# Patient Record
Sex: MALE | Race: WHITE | NOT HISPANIC OR LATINO | Employment: OTHER | URBAN - METROPOLITAN AREA
[De-identification: names, ages, dates, MRNs, and addresses within clinical notes are randomized per-mention and may not be internally consistent; named-entity substitution may affect disease eponyms.]

---

## 2017-12-07 ENCOUNTER — OFFICE VISIT (OUTPATIENT)
Dept: CARDIOLOGY | Facility: CLINIC | Age: 37
End: 2017-12-07
Payer: COMMERCIAL

## 2017-12-07 VITALS
WEIGHT: 234.56 LBS | DIASTOLIC BLOOD PRESSURE: 68 MMHG | SYSTOLIC BLOOD PRESSURE: 120 MMHG | BODY MASS INDEX: 29.16 KG/M2 | HEART RATE: 68 BPM | HEIGHT: 75 IN

## 2017-12-07 DIAGNOSIS — R42 VERTIGO: ICD-10-CM

## 2017-12-07 DIAGNOSIS — I34.0 NON-RHEUMATIC MITRAL REGURGITATION: Primary | ICD-10-CM

## 2017-12-07 DIAGNOSIS — Z86.79 H/O ANGINA PECTORIS: ICD-10-CM

## 2017-12-07 DIAGNOSIS — I49.3 PVC'S (PREMATURE VENTRICULAR CONTRACTIONS): ICD-10-CM

## 2017-12-07 DIAGNOSIS — Z13.220 LIPID SCREENING: ICD-10-CM

## 2017-12-07 PROCEDURE — 99999 PR PBB SHADOW E&M-NEW PATIENT-LVL III: CPT | Mod: PBBFAC,,, | Performed by: INTERNAL MEDICINE

## 2017-12-07 PROCEDURE — 99213 OFFICE O/P EST LOW 20 MIN: CPT | Mod: S$GLB,,, | Performed by: INTERNAL MEDICINE

## 2017-12-07 NOTE — PATIENT INSTRUCTIONS
About Arrhythmias    Electrical impulses cause the normal heart to beat 60 to 100 times a minute while at rest. These impulses come from a natural pacemaker deep inside the heart muscle. Each impulse causes the heart muscle to contract. This causes the blood to flow through the heart and out to the tissues and organs of your body.  An arrhythmia is a change from the normal speed or pattern of these electrical impulses. This can cause the heart to beat too fast (tachycardia); or too slow (bradycardia); or in an unsteady pattern (irregular rhythm).  Symptoms of arrhythmias  Different people experience arrhythmias differently. Sometimes they may not have symptoms, but just notice a change in their pulse. Symptoms can include:  · Fluttering feeling in the chest  · Shortness of breath  · Chest pain or pressure  · Neck fullness  · Lightheadedness or dizziness  · Fainting or almost fainting  · Palpitations (the sense that your heart is fluttering or beating fast or hard or irregularly)  · Tiredness, fatigue, or weakness  · Cardiac arrest  Causes of arrhythmias  Arrhythmias are most often due to heart disease such as:  · Coronary artery disease  · Heart valve disease  · Enlarged heart  · High blood pressure  · Heart failure  Other causes of  arrhythmia include:  · Certain medicines (such as asthma inhalers and decongestants)  · Some herbal supplements  · Cardiac stimulant drugs (such as cocaine, amphetamine, diet pills, certain decongestant cold medicines, caffeine, and nicotine)  · Excessive alcohol use  · Anxiety and panic disorder  · Thyroid disease  · Anemia  · Diabetes  · Sleep apnea  · Obesity  · Congenital heart disease  · Cardiac genetic diseases  Arrhythmias can often be prevented. The cause and type of arrhythmia determines the best treatment. Sometimes your doctor may want to monitor your heart rate over a 24-hour period or longer. This can help identify the cause of your arrhythmia and find the best treatment.  This can be done with a Holter monitor, a portable EKG recording device attached by wires to your chest. Or you may get an event monitor, which you can place over the skin in front of your heart to record heart rhythms. You can carry this with you as you go about your routine activities during the monitoring period. Implantable loop recorders may also be used to monitor the heart rhythm for up to 2 years. This miniature device is placed underneath the skin overlying the heart.  Home care  The following guidelines will help you care for yourself at home:  · Avoid cardiac stimulants (such as cocaine, amphetamine, diet pills, certain decongestant cold medicines, caffeine, and nicotine).  · If you smoke, stop smoking. Contact your doctor or a local stop-smoking program for help.  · Tell your doctor about any prescription, over-the-counter, or herbal medicines you take. These may be affecting your heart rhythm.  Follow-up care  Follow up with your healthcare provider, or as advised. If a Holter monitor has been recommended, contact the cardiologist you have been referred to as soon as you can  the device. Other outpatient tests may also be arranged for you at that time.  Call 911  This is the fastest and safest way to get to the emergency department. The paramedics can also start treatment on the way to the hospital, if needed.  Don't wait until your symptoms are severe to call 911. Other reasons to call 911 besides chest pain include:  · Chest, shoulder, arm, neck, or back pain  · Shortness of breath  · Feeling lightheaded, faint, or dizzy  · Unexplained fainting  · Rapid heart beat  · Slower than usual heart rate compared to your normal  · Very irregular heartbeat  · Chest pain (angina) with weakness, dizziness, heavy sweating, nausea, or vomiting  · Extreme drowsiness, or confusion  · Weakness of an arm or leg or one side of the face  · Difficulty with speech or vision  When to seek medical advice  Remember,  "things are not always like they are on TV. Sometimes it is not so obvious. You may only feel weak or just "not right." If it is not clear or if you have any doubt, call for advice.  · Seek help for chest pain, or it feels different from usual, even if your symptoms are mild.  · Don't drive yourself. Have someone else drive. If no one can drive you, call 911.  · If your doctor has given you medicines to take when you have symptoms, take them, but do not delay getting help while trying to find them.  Date Last Reviewed: 4/25/2016  © 8219-9312 [employer]. [address] [place] [address], [place] [address]. All rights reserved. This information is not intended as a substitute for professional medical care. Always follow your healthcare professional's instructions.        Dizziness (Uncertain Cause)  Dizziness is a common symptom. It may be described as lightheadedness, spinning, or feeling like you are going to faint. Dizziness can have many causes.  Be sure to tell the healthcare provider about:  · All medicines you take, including prescription, over-the-counter, herbs, and supplements  · Any other symptoms you have  · Any health problems you are being treated for  · Anything that causes the dizziness to get worse or better  Today's exam did not show an exact cause for your dizziness. Other tests may be needed. Follow up with your healthcare provider.  Home care  · Dizziness that occurs with sudden standing may be a sign of mild dehydration. Drink extra fluids for the next few days.  · If you recently started a new medicine, stopped a medicine, or had the dose of a current medicine changed, talk with the prescribing healthcare provider. Your medicine plan may need adjustment.  · If dizziness lasts more than a few seconds, sit or lie down until it passes. This may help prevent injury in case you pass out.  · Do not drive or use power tools or dangerous equipment until you have had no dizziness for at least 48 " hours.  Follow-up care  Follow up with your healthcare provider for further evaluation within the next 7 days or as advised.  When to seek medical advice  Call your healthcare provider for any of the following:  · Worsening of symptoms or new symptoms  · Passing out or seizure  · Repeated vomiting  · Headache  · Palpitations (the sense that your heart is fluttering or beating fast or hard)  · Shortness of breath  · Blood in vomit or stool (black or red color)  · Weakness of an arm or leg or one side of the face  · Vision or hearing changes  · Trouble walking or speaking  · Chest, arm, neck, back, or jaw pain  Date Last Reviewed: 8/23/2015 © 2000-2017 [employer]. [address] [place] [address], [place] [address]. All rights reserved. This information is not intended as a substitute for professional medical care. Always follow your healthcare professional's instructions.        Heart Valve Problems  Your hearts job is to pump blood through your body. That job starts with pumping blood through the heart itself. Inside your heart, blood passes through a series of one-way reyes called valves. If a valve works poorly, not enough blood moves forward. A problem heart valve may not open wide enough, not close tightly enough, or both. In any case, not enough blood is sent to the heart muscle or out to the body.  Symptoms of heart valve problems  You can have a problem valve for decades yet have no symptoms. If you do have symptoms, they may come on so slowly that you barely notice them. In other cases, though, symptoms appear suddenly. You might have one or more of these symptoms:  · Problems breathing when you lie down, exert yourself, or get stressed emotionally  · Pain, pressure, tightness, or numbness in your chest, neck, back, or arms (angina)  · Feeling dizzy, faint, or lightheaded  · Tiredness, especially with activity or as the day goes on  · Waking up at night coughing or short of breath  · A fast, pounding,  or irregular heartbeat  · A fluttering feeling in your chest  · Swollen ankles or feet  · Fainting, especially upon standing up or with exertion  Problems opening (stenosis)    When a valve doesnt open all the way, the problem is called stenosis. The leaflets may be stuck together or too stiff to open fully. When the valve doesnt open fully, blood has to flow through a smaller opening. So the heart muscle has to work harder to push the blood through the valve.  Problems closing (regurgitation)    When a valve doesnt close tightly enough and blood leaks backward through the valve, the problem is called regurgitation or insufficiency. The valve itself may be described as leaky. Leaflets may fit together poorly. Or the structures that support them may be torn. Some blood leaks through the valve back into the chamber it just left. So the heart has to move that blood twice. This can result in heart muscle damage.  Common causes of valve problems  People of any age can have heart valve trouble. You may have been born with a problem valve. Or a valve may have worn out as youve aged. It may not be possible to pinpoint what caused your valve problem. But common causes include:  · Buildup of calcium or scar tissue on a valve  · Rheumatic fever and certain other infections and diseases  · High blood pressure  · Other heart problems, such as coronary artery disease  · Congenital defects of the heart valves      Date Last Reviewed: 6/1/2016 © 2000-2017 [employer]. [address] [place] [address]. All rights reserved. This information is not intended as a substitute for professional medical care. Always follow your healthcare professional's instructions.        Vertigo (Unknown Cause)    In addition to helping with hearing, the inner ear is part of the balance center of your body. Problems with the inner ear can a false feeling of motion. This is called vertigo. Often, it feels as if you or the room  is spinning. A vertigo attack may cause sudden nausea, vomiting and heavy sweating. Severe vertigo causes a loss of balance and can cause you to fall. During vertigo, small head movements and changes in body position will often make the symptoms worse. You may also have ringing in the ears called tinnitus.  An episode of vertigo may last seconds, minutes or hours. Once you are over the first episode, it may never come back. However, symptoms may return off and on.  The cause of your vertigo is not yet known. Possible causes of vertigo include:  · Inflammation of the inner ear  · Disease of the nerves to the inner ear  · Movement of calcium particles in the inner ear  · Poor blood flow to the balance centers of the brain  · Migraine headaches  Home care  · If symptoms are severe, rest quietly in bed. Change positions very slowly. There is usually one position that will feel best, such as lying on one side or lying on your back with your head slightly raised on pillows.  · Do not drive a car or work with dangerous machinery until symptoms have been gone for at least one week.  · Take medicine as prescribed to relieve your symptoms. Unless another medicine was prescribed for symptoms of nausea, vomiting, and dizziness, you may use over-the-counter motion sickness pills. Ask your pharmacist for suggestions.  Follow-up care  Follow up with your healthcare provider or as directed. If you are referred to a specialist or for testing, make the appointment promptly.  When to seek medical advice  Call your healthcare provider if any of the following occur:  · Fever of 100.4°F (38ºC) or higher, or as directed by your healthcare provider  · Vertigo worsens or is not controlled by prescribed medicine   · Repeated vomiting not relieved by prescribed medicine   · Severe headache  · Confusion  · Weakness of an arm or leg or one side of the face  · Difficulty with speech or vision  · Loss of consciousness   · Seizure  Date Last  Reviewed: 8/16/2015  © 4649-5026 [employer]. [address] [place] [address], [place] [address]. All rights reserved. This information is not intended as a substitute for professional medical care. Always follow your healthcare professional's instructions.

## 2017-12-07 NOTE — PROGRESS NOTES
Subjective:    Patient ID:  Jaren Breen is a 37 y.o. male who presents for Dizziness; Valvular Heart Disease; and Irregular Heart Beat        HPI  Pt was followed at [facility],  new to this clinic, has mild MR, PVC'S, ANGINA, in the past, DOING OK, SOME DIZZINESS/ VERTIGO FOR FEW MON, TO SEE DR DUDLEY ENT IN AM, SEE ROS    Past Medical History:   Diagnosis Date    Heart murmur     Stomach ulcer     Valvular regurgitation      Past Surgical History:   Procedure Laterality Date    CARDIAC CATHETERIZATION      ESOPHAGOGASTRODUODENOSCOPY       Family History   Problem Relation Age of Onset    Heart disease Mother     Hypertension Father      Social History     Social History    Marital status:      Spouse name: N/A    Number of children: N/A    Years of education: N/A     Social History Main Topics    Smoking status: Former Smoker     Start date: 2004     Quit date: 2013    Smokeless tobacco: Never Used    Alcohol use Yes      Comment: Occasional    Drug use: No    Sexual activity: Not Asked     Other Topics Concern    None     Social History Narrative    None       Review of patient's allergies indicates:  No Known Allergies    Current Outpatient Prescriptions:     cyclobenzaprine (FLEXERIL) 10 MG tablet, Take 1 tablet (10 mg total) by mouth 3 (three) times daily as needed for Muscle spasms., Disp: 15 tablet, Rfl: 0    pantoprazole (PROTONIX) 40 MG tablet, Take 40 mg by mouth once daily., Disp: , Rfl:     sucralfate (CARAFATE) 1 gram tablet, Take 1 g by mouth once daily. , Disp: , Rfl:     diclofenac (VOLTAREN) 75 MG EC tablet, Take 1 tablet (75 mg total) by mouth 2 (two) times daily., Disp: 14 tablet, Rfl: 0    Review of Systems   Constitution: Negative for chills, diaphoresis, fever, weakness, malaise/fatigue and night sweats.   HENT: Negative for congestion, hearing loss and nosebleeds.    Eyes: Negative for blurred vision, discharge, double vision and visual disturbance.  "  Cardiovascular: Negative for chest pain, claudication, cyanosis, dyspnea on exertion, irregular heartbeat (OCC), leg swelling, near-syncope, orthopnea, palpitations (OCC), paroxysmal nocturnal dyspnea and syncope.   Respiratory: Negative for cough, hemoptysis, shortness of breath, sleep disturbances due to breathing and wheezing.    Endocrine: Negative for cold intolerance, heat intolerance and polyuria.   Hematologic/Lymphatic: Negative for adenopathy and bleeding problem. Does not bruise/bleed easily.   Skin: Negative for color change, itching and nail changes.   Musculoskeletal: Negative for back pain (CHRONIC) and falls. Joint pain: SHOULDERS.   Gastrointestinal: Negative for abdominal pain, change in bowel habit, dysphagia, heartburn (STABLE GERD), hematemesis, jaundice, melena and vomiting.   Genitourinary: Negative for dysuria, flank pain and frequency.   Neurological: Negative for brief paralysis, difficulty with concentration, disturbances in coordination, dizziness, focal weakness, light-headedness, loss of balance, numbness, paresthesias, seizures, sensory change and tremors.   Psychiatric/Behavioral: Negative for altered mental status, depression, memory loss and substance abuse. The patient is not nervous/anxious.    Allergic/Immunologic: Negative for hives and persistent infections.        Objective:      Vitals:    12/07/17 1448   BP: 120/68   Pulse: 68   Weight: 106.4 kg (234 lb 9.1 oz)   Height: 6' 3" (1.905 m)   PainSc: 0-No pain     Body mass index is 29.32 kg/m².    Physical Exam   Constitutional: He is oriented to person, place, and time. He appears well-developed and well-nourished. He is active.   HENT:   Head: Normocephalic and atraumatic.   Mouth/Throat: Oropharynx is clear and moist and mucous membranes are normal.   Eyes: Conjunctivae and EOM are normal. Pupils are equal, round, and reactive to light.   Neck: Normal range of motion. Neck supple. Normal carotid pulses, no hepatojugular " reflux and no JVD present. Carotid bruit is not present. No tracheal deviation, no edema and no erythema present. No thyromegaly present.   Cardiovascular: Normal rate and regular rhythm.   No extrasystoles are present. PMI is not displaced.  Exam reveals no gallop, no distant heart sounds, no friction rub and no midsystolic click.    Murmur heard.  High-pitched holosystolic murmur is present  at the apex  Pulses:       Carotid pulses are 2+ on the right side, and 2+ on the left side.       Radial pulses are 2+ on the right side, and 2+ on the left side.        Femoral pulses are 2+ on the right side, and 2+ on the left side.       Dorsalis pedis pulses are 2+ on the right side, and 2+ on the left side.        Posterior tibial pulses are 2+ on the right side, and 2+ on the left side.   Pulmonary/Chest: Effort normal and breath sounds normal. No accessory muscle usage. No tachypnea and no bradypnea. No respiratory distress.   Abdominal: Soft. Bowel sounds are normal. He exhibits no distension and no mass. There is no hepatosplenomegaly. There is no tenderness. There is no CVA tenderness.   Musculoskeletal: Normal range of motion. He exhibits no edema or deformity.   Lymphadenopathy:     He has no cervical adenopathy.   Neurological: He is alert and oriented to person, place, and time. He has normal strength. He displays no tremor. No cranial nerve deficit. He exhibits normal muscle tone. Coordination normal.   Skin: Skin is warm and dry. No cyanosis or erythema. No pallor.   Psychiatric: He has a normal mood and affect. His speech is normal and behavior is normal. Judgment and thought content normal.               ..    Chemistry        Component Value Date/Time     07/21/2016 1501    K 4.1 07/21/2016 1501     07/21/2016 1501    CO2 27 07/21/2016 1501    BUN 15 07/21/2016 1501    CREATININE 1.02 07/21/2016 1501    GLU 95 07/21/2016 1501        Component Value Date/Time    CALCIUM 9.0 07/21/2016 1501     ALKPHOS 46 07/21/2016 1501    AST 32 07/21/2016 1501    AST 31 05/11/2016 1820    ALT 36 07/21/2016 1501    BILITOT 0.5 07/21/2016 1501    ESTGFRAFRICA >60 07/21/2016 1501    EGFRNONAA >60 07/21/2016 1501            ..No results found for: CHOL  No results found for: HDL  No results found for: LDLCALC  No results found for: TRIG  No results found for: CHOLHDL  ..  Lab Results   Component Value Date    WBC 6.77 07/21/2016    HGB 14.2 07/21/2016    HCT 42.5 07/21/2016    MCV 91 07/21/2016     07/21/2016       Test(s) Reviewed  I have reviewed the following in detail:  [] Stress test   [] Angiography   [] Echocardiogram   [] Labs   [x] Other:       Assessment:         ICD-10-CM ICD-9-CM   1. Non-rheumatic mitral regurgitation I34.0 424.0   2. PVC's (premature ventricular contractions) I49.3 427.69   3. Vertigo R42 780.4   4. H/O angina pectoris Z86.79 V12.59   5. Lipid screening Z13.220 V77.91     Problem List Items Addressed This Visit        ENT    Vertigo       Cardiac/Vascular    Non-rheumatic mitral regurgitation - Primary    PVC's (premature ventricular contractions)    H/O angina pectoris    Lipid screening           Plan:     ALL CV CLINICALLY STABLE, NO ANGINA, NO HF, NO TIA, NO SIGNIFICANT CLINICAL ARRHYTHMIA,CONTINUE CURRENT MEDS, EDUCATION, DIET, EXERCISE, SEE ENT, RTC IN 8-9 MO WITH LABS      Non-rheumatic mitral regurgitation    PVC's (premature ventricular contractions)    Vertigo    H/O angina pectoris    Lipid screening    RTC Low level/low impact aerobic exercise 5x's/wk. Heart healthy diet and risk factor modification.    See labs and med orders.    Aerobic exercise 5x's/wk. Heart healthy diet and risk factor modification.    See labs and med orders.

## 2018-03-12 PROBLEM — Z13.220 LIPID SCREENING: Status: RESOLVED | Noted: 2017-12-07 | Resolved: 2018-03-12

## 2018-06-21 ENCOUNTER — TELEPHONE (OUTPATIENT)
Dept: CARDIOLOGY | Facility: CLINIC | Age: 38
End: 2018-06-21

## 2018-06-21 NOTE — TELEPHONE ENCOUNTER
----- Message from Meliza Cruz sent at 6/21/2018  8:16 AM CDT -----  Contact: self  Patient [phone] has scheduled his 9 month followup appt with Dr Milner in [place] for Wednesday 09 19 18 at 1:15am/he will need blood work and he goes to [facility] for his blood work/he will need orders for his blood work/please advise

## 2018-09-13 ENCOUNTER — TELEPHONE (OUTPATIENT)
Dept: CARDIOLOGY | Facility: CLINIC | Age: 38
End: 2018-09-13

## 2018-09-13 NOTE — TELEPHONE ENCOUNTER
----- Message from Parul Michele sent at 9/13/2018  4:14 PM CDT -----  Contact: Patient  Patient is calling to request that the office send his lab orders over to [facility] in [place] so that he can go over there in the morning to do his labs for his appointment next week.  Please call to let him know if they will be sent for the morning.  Call Back#[phone]  Thanks

## 2018-09-13 NOTE — TELEPHONE ENCOUNTER
Advised patient that lab orders have been faxed to [facility] labs, per request. Patient verbalized understanding.

## 2018-09-19 ENCOUNTER — OFFICE VISIT (OUTPATIENT)
Dept: CARDIOLOGY | Facility: CLINIC | Age: 38
End: 2018-09-19
Payer: COMMERCIAL

## 2018-09-19 VITALS
BODY MASS INDEX: 29.03 KG/M2 | WEIGHT: 233.44 LBS | HEART RATE: 72 BPM | OXYGEN SATURATION: 98 % | HEIGHT: 75 IN | DIASTOLIC BLOOD PRESSURE: 66 MMHG | SYSTOLIC BLOOD PRESSURE: 120 MMHG

## 2018-09-19 DIAGNOSIS — I49.3 PVC'S (PREMATURE VENTRICULAR CONTRACTIONS): ICD-10-CM

## 2018-09-19 DIAGNOSIS — R06.02 SOB (SHORTNESS OF BREATH): ICD-10-CM

## 2018-09-19 DIAGNOSIS — Z82.49 FAMILY HISTORY OF PREMATURE CAD: ICD-10-CM

## 2018-09-19 DIAGNOSIS — I34.0 NON-RHEUMATIC MITRAL REGURGITATION: Primary | ICD-10-CM

## 2018-09-19 DIAGNOSIS — E78.00 HYPERCHOLESTEROLEMIA: ICD-10-CM

## 2018-09-19 PROCEDURE — 99214 OFFICE O/P EST MOD 30 MIN: CPT | Mod: S$GLB,,, | Performed by: INTERNAL MEDICINE

## 2018-09-19 PROCEDURE — 3008F BODY MASS INDEX DOCD: CPT | Mod: CPTII,S$GLB,, | Performed by: INTERNAL MEDICINE

## 2018-09-19 RX ORDER — PRAVASTATIN SODIUM 10 MG/1
10 TABLET ORAL DAILY
Qty: 90 TABLET | Refills: 0 | Status: SHIPPED | OUTPATIENT
Start: 2018-09-19 | End: 2019-02-06 | Stop reason: SINTOL

## 2018-09-19 NOTE — PROGRESS NOTES
Subjective:    Patient ID:  Jaren Breen is a 38 y.o. male who presents for Valvular Heart Disease (labs) and Hyperlipidemia        HPI  DISCUSSED LABS AND GOALS, , NOT TAKING MEDS, DOING OK, TAKES PROTONIX AND CARAFATE, SEE ROS    Past Medical History:   Diagnosis Date    Heart murmur     Hypercholesterolemia 2018    Stomach ulcer     Valvular regurgitation      Past Surgical History:   Procedure Laterality Date    CARDIAC CATHETERIZATION      ESOPHAGOGASTRODUODENOSCOPY       Family History   Problem Relation Age of Onset    Heart disease Mother     Hypertension Father      Social History     Socioeconomic History    Marital status:      Spouse name: Not on file    Number of children: Not on file    Years of education: Not on file    Highest education level: Not on file   Social Needs    Financial resource strain: Not on file    Food insecurity - worry: Not on file    Food insecurity - inability: Not on file    Transportation needs - medical: Not on file    Transportation needs - non-medical: Not on file   Occupational History    Not on file   Tobacco Use    Smoking status: Former Smoker     Start date:      Last attempt to quit:      Years since quittin.7    Smokeless tobacco: Never Used   Substance and Sexual Activity    Alcohol use: Yes     Comment: Occasional    Drug use: No    Sexual activity: Not on file   Other Topics Concern    Not on file   Social History Narrative    Not on file       Review of patient's allergies indicates:  No Known Allergies    Current Outpatient Medications:     pantoprazole (PROTONIX) 40 MG tablet, Take 40 mg by mouth once daily., Disp: , Rfl:     sucralfate (CARAFATE) 1 gram tablet, Take 1 g by mouth once daily. , Disp: , Rfl:     pravastatin (PRAVACHOL) 10 MG tablet, Take 1 tablet (10 mg total) by mouth once daily., Disp: 90 tablet, Rfl: 0    Review of Systems   Constitution: Negative for chills, diaphoresis, fever,  "weakness, malaise/fatigue (SOME) and night sweats.   HENT: Negative for congestion and nosebleeds.    Eyes: Negative for blurred vision and visual disturbance.   Cardiovascular: Negative for chest pain, claudication, cyanosis, dyspnea on exertion (SOME), irregular heartbeat (OCC), leg swelling, near-syncope, orthopnea, palpitations, paroxysmal nocturnal dyspnea and syncope.   Respiratory: Positive for shortness of breath. Negative for cough, hemoptysis and wheezing.    Endocrine: Negative for cold intolerance, heat intolerance and polyuria.   Hematologic/Lymphatic: Negative for adenopathy and bleeding problem. Does not bruise/bleed easily.   Skin: Negative for color change, itching and nail changes.   Musculoskeletal: Negative for back pain (CHRONIC), falls and myalgias (L CALF, WITH PROLONGED SITTING). Joint pain: SHOULDERS.   Gastrointestinal: Negative for abdominal pain, change in bowel habit, dysphagia, heartburn (STABLE GERD), hematemesis, jaundice, melena and vomiting.   Genitourinary: Negative for dysuria, flank pain and frequency.   Neurological: Negative for brief paralysis, dizziness (SOME), focal weakness, light-headedness, loss of balance, numbness and tremors.   Psychiatric/Behavioral: Negative for altered mental status and depression.   Allergic/Immunologic: Negative for hives and persistent infections.        Objective:      Vitals:    09/19/18 1318   BP: 120/66   Pulse: 72   SpO2: 98%   Weight: 105.9 kg (233 lb 7.5 oz)   Height: 6' 3" (1.905 m)   PainSc:   4   PainLoc: Back     Body mass index is 29.18 kg/m².    Physical Exam   Constitutional: He is oriented to person, place, and time. He appears well-developed and well-nourished. He is active.   HENT:   Head: Normocephalic and atraumatic.   Mouth/Throat: Oropharynx is clear and moist and mucous membranes are normal.   Eyes: Conjunctivae and EOM are normal. Pupils are equal, round, and reactive to light.   Neck: Normal range of motion. Neck supple. " Normal carotid pulses, no hepatojugular reflux and no JVD present. Carotid bruit is not present. No tracheal deviation, no edema and no erythema present. No thyromegaly present.   Cardiovascular: Normal rate and regular rhythm.  No extrasystoles are present. PMI is not displaced. Exam reveals no gallop, no distant heart sounds, no friction rub and no midsystolic click.   Murmur heard.  High-pitched holosystolic murmur is present at the apex.  Pulses:       Carotid pulses are 2+ on the right side, and 2+ on the left side.       Radial pulses are 2+ on the right side, and 2+ on the left side.        Femoral pulses are 2+ on the right side, and 2+ on the left side.       Dorsalis pedis pulses are 2+ on the right side, and 2+ on the left side.        Posterior tibial pulses are 2+ on the right side, and 2+ on the left side.   Pulmonary/Chest: Effort normal and breath sounds normal. No accessory muscle usage. No tachypnea and no bradypnea. No respiratory distress.   Abdominal: Soft. Bowel sounds are normal. He exhibits no distension and no mass. There is no hepatosplenomegaly. There is no tenderness. There is no CVA tenderness.   Musculoskeletal: Normal range of motion. He exhibits no edema or deformity.   Lymphadenopathy:     He has no cervical adenopathy.   Neurological: He is alert and oriented to person, place, and time. He has normal strength. He displays no tremor. No cranial nerve deficit.   Skin: Skin is warm and dry. No cyanosis or erythema. No pallor.   Psychiatric: He has a normal mood and affect. His speech is normal and behavior is normal.               ..    Chemistry        Component Value Date/Time     07/21/2016 1501    K 4.1 07/21/2016 1501     07/21/2016 1501    CO2 27 07/21/2016 1501    BUN 15 07/21/2016 1501    CREATININE 1.02 07/21/2016 1501    GLU 95 07/21/2016 1501        Component Value Date/Time    CALCIUM 9.0 07/21/2016 1501    ALKPHOS 46 07/21/2016 1501    AST 32 07/21/2016 1501     AST 31 05/11/2016 1820    ALT 36 07/21/2016 1501    BILITOT 0.5 07/21/2016 1501    ESTGFRAFRICA >60 07/21/2016 1501    EGFRNONAA >60 07/21/2016 1501            ..No results found for: CHOL  No results found for: HDL  No results found for: LDLCALC  No results found for: TRIG  No results found for: CHOLHDL  ..  Lab Results   Component Value Date    WBC 6.77 07/21/2016    HGB 14.2 07/21/2016    HCT 42.5 07/21/2016    MCV 91 07/21/2016     07/21/2016       Test(s) Reviewed  I have reviewed the following in detail:  [] Stress test   [] Angiography   [] Echocardiogram   [x] Labs   [] Other:       Assessment:         ICD-10-CM ICD-9-CM   1. Non-rheumatic mitral regurgitation I34.0 424.0   2. SOB (shortness of breath) R06.02 786.05   3. Hypercholesterolemia E78.00 272.0   4. PVC's (premature ventricular contractions) I49.3 427.69   5. Family history of premature CAD Z82.49 V17.3     Problem List Items Addressed This Visit        Cardiac/Vascular    Non-rheumatic mitral regurgitation - Primary    Relevant Orders    Echocardiogram stress test with CFD    PVC's (premature ventricular contractions)    Hypercholesterolemia    Relevant Orders    Lipid panel    Hepatic function panel       Other    Family history of premature CAD    SOB (shortness of breath)    Relevant Orders    Echocardiogram stress test with CFD           Plan:     ADD PRAVACHOL,FOR DYSLIPIDEMIA, STRONG FH OF EARLY CAD, CHECK STRESS ECHO IN VIEW OF SX,  LFT'S OK IN January, ALL  OTHERCV CLINICALLY STABLE,  CLEAR ANGINA, NO HF, NO TIA, NO CLINICAL ARRHYTHMIA,CONTINUE CURRENT MEDS, EDUCATION, DIET, EXERCISE, RTC IN FEW MON      Non-rheumatic mitral regurgitation  -     Echocardiogram stress test with CFD; Future    SOB (shortness of breath)  -     Echocardiogram stress test with CFD; Future    Hypercholesterolemia  -     Lipid panel; Future; Expected date: 09/19/2018  -     Hepatic function panel; Future; Expected date: 09/19/2018    PVC's (premature  ventricular contractions)    Family history of premature CAD    Other orders  -     pravastatin (PRAVACHOL) 10 MG tablet; Take 1 tablet (10 mg total) by mouth once daily.  Dispense: 90 tablet; Refill: 0    RTC Low level/low impact aerobic exercise 5x's/wk. Heart healthy diet and risk factor modification.    See labs and med orders.    Aerobic exercise 5x's/wk. Heart healthy diet and risk factor modification.    See labs and med orders.

## 2018-09-26 ENCOUNTER — TELEPHONE (OUTPATIENT)
Dept: CARDIOLOGY | Facility: CLINIC | Age: 38
End: 2018-09-26

## 2018-09-26 NOTE — TELEPHONE ENCOUNTER
----- Message from Armani Hilairo sent at 9/26/2018 10:58 AM CDT -----  Contact: Pt  The Pt is requesting a call back regarding rescheduling the test he has tomorrow. Please call Pt to advise.     Call Back#: [phone]   Thanks

## 2018-10-15 ENCOUNTER — TELEPHONE (OUTPATIENT)
Dept: CARDIOLOGY | Facility: CLINIC | Age: 38
End: 2018-10-15

## 2018-10-15 NOTE — TELEPHONE ENCOUNTER
----- Message from Vasile Hancock sent at 10/15/2018 11:44 AM CDT -----  Contact: self   Patient missed his echo stress test on 10/5, he would like to reschedule for next week. Please call back at [phone] (home)

## 2018-10-22 ENCOUNTER — TELEPHONE (OUTPATIENT)
Dept: CARDIOLOGY | Facility: CLINIC | Age: 38
End: 2018-10-22

## 2018-10-24 ENCOUNTER — CLINICAL SUPPORT (OUTPATIENT)
Dept: CARDIOLOGY | Facility: CLINIC | Age: 38
End: 2018-10-24
Attending: INTERNAL MEDICINE
Payer: COMMERCIAL

## 2018-10-24 VITALS — HEIGHT: 75 IN | WEIGHT: 233 LBS | BODY MASS INDEX: 28.97 KG/M2

## 2018-10-24 DIAGNOSIS — I34.0 NON-RHEUMATIC MITRAL REGURGITATION: ICD-10-CM

## 2018-10-24 DIAGNOSIS — R06.02 SOB (SHORTNESS OF BREATH): ICD-10-CM

## 2018-10-24 LAB
ASCENDING AORTA: 3.06 CM
AV MEAN GRADIENT: 3.03 MMHG
AV PEAK GRADIENT: 5.17 MMHG
AV VALVE AREA: 4.38 CM2
BSA FOR ECHO PROCEDURE: 2.36 M2
CV ECHO LV RWT: 0.34 CM
CV STRESS BASE HR: 76
DIASTOLIC BLOOD PRESSURE: 71
DOP CALC AO PEAK VEL: 1.14 M/S
DOP CALC AO VTI: 27.04 CM
DOP CALC LVOT AREA: 5.23 CM2
DOP CALC LVOT DIAMETER: 2.58 CM
DOP CALC LVOT STROKE VOLUME: 118.4 CM3
DOP CALCLVOT PEAK VEL VTI: 22.66 CM
E WAVE DECELERATION TIME: 149.87 MSEC
E/A RATIO: 1.45
E/E' RATIO: 4.53
ECHO LV POSTERIOR WALL: 0.85 CM (ref 0.6–1.1)
FRACTIONAL SHORTENING: 40 % (ref 28–44)
INTERVENTRICULAR SEPTUM: 1.14 CM (ref 0.6–1.1)
IVRT: 0.08 MSEC
LA MAJOR: 5.61 CM
LA MINOR: 5.72 CM
LA WIDTH: 4.53 CM
LEFT ATRIUM SIZE: 4.32 CM
LEFT ATRIUM VOLUME INDEX: 39.9 ML/M2
LEFT ATRIUM VOLUME: 94.22 CM3
LEFT INTERNAL DIMENSION IN SYSTOLE: 3.44 CM (ref 2.1–4)
LEFT VENTRICLE DIASTOLIC VOLUME INDEX: 70.08 ML/M2
LEFT VENTRICLE DIASTOLIC VOLUME: 165.4 ML
LEFT VENTRICLE MASS INDEX: 97.6 G/M2
LEFT VENTRICLE SYSTOLIC VOLUME INDEX: 20.7 ML/M2
LEFT VENTRICLE SYSTOLIC VOLUME: 48.81 ML
LEFT VENTRICULAR INTERNAL DIMENSION IN DIASTOLE: 5.78 CM (ref 3.5–6)
LEFT VENTRICULAR MASS: 230.23 G
LV LATERAL E/E' RATIO: 3.78
LV SEPTAL E/E' RATIO: 5.67
MV PEAK A VEL: 0.47 M/S
MV PEAK E VEL: 0.68 M/S
OHS CV CPX 1 MINUTE RECOVERY HEART RATE: 103 BPM
OHS CV CPX 85 PERCENT MAX PREDICTED HEART RATE MALE: 155
OHS CV CPX ESTIMATED METS: 14
OHS CV CPX MAX PREDICTED HEART RATE: 182
OHS CV CPX PATIENT IS FEMALE: 0
OHS CV CPX PATIENT IS MALE: 1
OHS CV CPX PEAK DIASTOLIC BLOOD PRESSURE: 66 MMHG
OHS CV CPX PEAK HEAR RATE: 164
OHS CV CPX PEAK RATE PRESSURE PRODUCT: ABNORMAL
OHS CV CPX PEAK SYSTOLIC BLOOD PRESSURE: 147
OHS CV CPX PERCENT MAX PREDICTED HEART RATE ACHIEVED: 90
OHS CV CPX PERCENT TARGET HEART RATE ACHIEVED: 105.81
OHS CV CPX RATE PRESSURE PRODUCT PRESENTING: 8360
OHS CV CPX TARGET HEART RATE: 155
PISA TR MAX VEL: 2.11 M/S
PULM VEIN S/D RATIO: 0.95
PV PEAK D VEL: 0.56 M/S
PV PEAK S VEL: 0.53 M/S
RA MAJOR: 5.23 CM
RA PRESSURE: 3 MMHG
RA WIDTH: 4.83 CM
RETIRED EF AND QEF - SEE NOTES: 70.49 %
RIGHT VENTRICULAR END-DIASTOLIC DIMENSION: 5.06 CM
SINUS: 3.19 CM
STJ: 2.73 CM
STRESS ECHO POST EXERCISE DUR MIN: 7 MIN
STRESS ECHO POST EXERCISE DUR SEC: 42
SYSTOLIC BLOOD PRESSURE: 110
TDI LATERAL: 0.18
TDI SEPTAL: 0.12
TDI: 0.15
TR MAX PG: 17.81 MMHG
TRICUSPID ANNULAR PLANE SYSTOLIC EXCURSION: 2.27 CM
TV REST PULMONARY ARTERY PRESSURE: 20.81 MMHG

## 2018-10-24 PROCEDURE — 99999 PR PBB SHADOW E&M-EST. PATIENT-LVL I: CPT | Mod: PBBFAC,,,

## 2018-10-24 PROCEDURE — 93351 STRESS TTE COMPLETE: CPT | Mod: S$GLB,,, | Performed by: INTERNAL MEDICINE

## 2018-11-02 ENCOUNTER — TELEPHONE (OUTPATIENT)
Dept: CARDIOLOGY | Facility: CLINIC | Age: 38
End: 2018-11-02

## 2019-02-06 ENCOUNTER — OFFICE VISIT (OUTPATIENT)
Dept: CARDIOLOGY | Facility: CLINIC | Age: 39
End: 2019-02-06
Payer: COMMERCIAL

## 2019-02-06 VITALS
OXYGEN SATURATION: 97 % | HEART RATE: 62 BPM | WEIGHT: 235.25 LBS | BODY MASS INDEX: 29.25 KG/M2 | SYSTOLIC BLOOD PRESSURE: 110 MMHG | HEIGHT: 75 IN | DIASTOLIC BLOOD PRESSURE: 62 MMHG

## 2019-02-06 DIAGNOSIS — E78.00 HYPERCHOLESTEROLEMIA: ICD-10-CM

## 2019-02-06 DIAGNOSIS — I49.3 PVC'S (PREMATURE VENTRICULAR CONTRACTIONS): ICD-10-CM

## 2019-02-06 DIAGNOSIS — T50.905A DRUG SIDE EFFECTS, INITIAL ENCOUNTER: ICD-10-CM

## 2019-02-06 DIAGNOSIS — I34.0 NON-RHEUMATIC MITRAL REGURGITATION: Primary | ICD-10-CM

## 2019-02-06 DIAGNOSIS — Z82.49 FAMILY HISTORY OF PREMATURE CAD: ICD-10-CM

## 2019-02-06 PROCEDURE — 3008F BODY MASS INDEX DOCD: CPT | Mod: CPTII,S$GLB,, | Performed by: INTERNAL MEDICINE

## 2019-02-06 PROCEDURE — 3008F PR BODY MASS INDEX (BMI) DOCUMENTED: ICD-10-PCS | Mod: CPTII,S$GLB,, | Performed by: INTERNAL MEDICINE

## 2019-02-06 PROCEDURE — 99214 OFFICE O/P EST MOD 30 MIN: CPT | Mod: S$GLB,,, | Performed by: INTERNAL MEDICINE

## 2019-02-06 PROCEDURE — 99214 PR OFFICE/OUTPT VISIT, EST, LEVL IV, 30-39 MIN: ICD-10-PCS | Mod: S$GLB,,, | Performed by: INTERNAL MEDICINE

## 2019-02-06 RX ORDER — ROSUVASTATIN CALCIUM 10 MG/1
10 TABLET, COATED ORAL DAILY
Qty: 90 TABLET | Refills: 1 | Status: SHIPPED | OUTPATIENT
Start: 2019-02-06 | End: 2019-09-10 | Stop reason: SDUPTHER

## 2019-02-06 RX ORDER — METOPROLOL SUCCINATE 25 MG/1
12.5 TABLET, EXTENDED RELEASE ORAL DAILY
Qty: 45 TABLET | Refills: 1 | Status: SHIPPED | OUTPATIENT
Start: 2019-02-06 | End: 2020-02-12 | Stop reason: SDUPTHER

## 2019-02-06 RX ORDER — NAPROXEN SODIUM 220 MG/1
81 TABLET, FILM COATED ORAL DAILY
COMMUNITY
Start: 2017-03-14

## 2019-02-06 NOTE — PROGRESS NOTES
Subjective:    Patient ID:  Jaren Breen is a 38 y.o. male who presents for Valvular Heart Disease and Hyperlipidemia        HPI  DISCUSSED TESTS, PVC'S OM STRESS, MILD MR, NEGATIVE STRESS ECHO, FEELS HEART BEATS AT TIME, ESPECIALLY WITH BEER,CBC AND LFT'S OK, STOPPED PRAVACHOL ? NAUSEA,  SEE ROS    Past Medical History:   Diagnosis Date    Heart murmur     Hypercholesterolemia 2018    Stomach ulcer     Valvular regurgitation      Past Surgical History:   Procedure Laterality Date    CARDIAC CATHETERIZATION      ESOPHAGOGASTRODUODENOSCOPY       Family History   Problem Relation Age of Onset    Heart disease Mother     Hypertension Father      Social History     Socioeconomic History    Marital status:      Spouse name: None    Number of children: None    Years of education: None    Highest education level: None   Social Needs    Financial resource strain: None    Food insecurity - worry: None    Food insecurity - inability: None    Transportation needs - medical: None    Transportation needs - non-medical: None   Occupational History    None   Tobacco Use    Smoking status: Former Smoker     Start date:      Last attempt to quit:      Years since quittin.1    Smokeless tobacco: Never Used   Substance and Sexual Activity    Alcohol use: Yes     Comment: Occasional    Drug use: No    Sexual activity: None   Other Topics Concern    None   Social History Narrative    None       Review of patient's allergies indicates:  No Known Allergies    Current Outpatient Medications:     aspirin 81 MG Chew, 81 mg every other day. , Disp: , Rfl:     pantoprazole (PROTONIX) 40 MG tablet, Take 40 mg by mouth once daily., Disp: , Rfl:     sucralfate (CARAFATE) 1 gram tablet, Take 1 g by mouth once daily. , Disp: , Rfl:     metoprolol succinate (TOPROL-XL) 25 MG 24 hr tablet, Take 0.5 tablets (12.5 mg total) by mouth once daily., Disp: 45 tablet, Rfl: 1    rosuvastatin  "(CRESTOR) 10 MG tablet, Take 1 tablet (10 mg total) by mouth once daily., Disp: 90 tablet, Rfl: 1    Review of Systems   Constitution: Negative for chills, diaphoresis, fever, weakness, malaise/fatigue (SOME) and night sweats.   HENT: Negative for congestion and nosebleeds.    Eyes: Negative for blurred vision and visual disturbance.   Cardiovascular: Negative for chest pain, claudication, cyanosis, dyspnea on exertion (SOME), irregular heartbeat, leg swelling, near-syncope, orthopnea, palpitations, paroxysmal nocturnal dyspnea and syncope.   Respiratory: Negative for cough, hemoptysis, shortness of breath and wheezing.    Endocrine: Negative for cold intolerance, heat intolerance and polyuria.   Hematologic/Lymphatic: Negative for adenopathy and bleeding problem. Does not bruise/bleed easily.   Skin: Negative for color change, itching and nail changes.   Musculoskeletal: Negative for back pain (CHRONIC), falls and myalgias (L CALF, WITH PROLONGED SITTING). Joint pain: SHOULDERS.   Gastrointestinal: Negative for abdominal pain, change in bowel habit, dysphagia, heartburn (STABLE GERD), hematemesis, jaundice, melena and vomiting.   Genitourinary: Negative for dysuria, flank pain and frequency.   Neurological: Negative for brief paralysis, dizziness (SOME), focal weakness, light-headedness, loss of balance and tremors. Numbness: OCC.   Psychiatric/Behavioral: Negative for altered mental status and depression. The patient is not nervous/anxious.    Allergic/Immunologic: Negative for hives and persistent infections.        Objective:      Vitals:    02/06/19 1201   BP: 110/62   Pulse: 62   SpO2: 97%   Weight: 106.7 kg (235 lb 3.7 oz)   Height: 6' 3" (1.905 m)   PainSc: 0-No pain     Body mass index is 29.4 kg/m².    Physical Exam   Constitutional: He is oriented to person, place, and time. He appears well-developed and well-nourished. He is active.   HENT:   Head: Normocephalic and atraumatic.   Mouth/Throat: Oropharynx " is clear and moist and mucous membranes are normal.   Eyes: Conjunctivae and EOM are normal. Pupils are equal, round, and reactive to light.   Neck: Normal range of motion. Neck supple. Normal carotid pulses, no hepatojugular reflux and no JVD present. Carotid bruit is not present. No tracheal deviation, no edema and no erythema present. No thyromegaly present.   Cardiovascular: Normal rate and regular rhythm.  No extrasystoles are present. PMI is not displaced. Exam reveals no gallop, no distant heart sounds, no friction rub and no midsystolic click.   Murmur heard.  High-pitched holosystolic murmur is present at the apex.  Pulses:       Carotid pulses are 2+ on the right side, and 2+ on the left side.       Radial pulses are 2+ on the right side, and 2+ on the left side.        Femoral pulses are 2+ on the right side, and 2+ on the left side.       Dorsalis pedis pulses are 2+ on the right side, and 2+ on the left side.        Posterior tibial pulses are 2+ on the right side, and 2+ on the left side.   Pulmonary/Chest: Effort normal and breath sounds normal. No accessory muscle usage. No tachypnea and no bradypnea. No respiratory distress.   Abdominal: Soft. Bowel sounds are normal. He exhibits no distension and no mass. There is no hepatosplenomegaly. There is no tenderness. There is no CVA tenderness.   Musculoskeletal: Normal range of motion. He exhibits no edema or deformity.   Lymphadenopathy:     He has no cervical adenopathy.   Neurological: He is alert and oriented to person, place, and time. He has normal strength. He displays no tremor. No cranial nerve deficit.   Skin: Skin is warm and dry. No cyanosis or erythema. No pallor.   Psychiatric: He has a normal mood and affect. His speech is normal and behavior is normal.               ..    Chemistry        Component Value Date/Time     07/21/2016 1501    K 4.1 07/21/2016 1501     07/21/2016 1501    CO2 27 07/21/2016 1501    BUN 15 07/21/2016  1501    CREATININE 1.02 07/21/2016 1501    GLU 95 07/21/2016 1501        Component Value Date/Time    CALCIUM 9.0 07/21/2016 1501    ALKPHOS 46 07/21/2016 1501    AST 32 07/21/2016 1501    AST 31 05/11/2016 1820    ALT 36 07/21/2016 1501    BILITOT 0.5 07/21/2016 1501    ESTGFRAFRICA >60 07/21/2016 1501    EGFRNONAA >60 07/21/2016 1501            ..No results found for: CHOL  No results found for: HDL  No results found for: LDLCALC  No results found for: TRIG  No results found for: CHOLHDL  ..  Lab Results   Component Value Date    WBC 6.77 07/21/2016    HGB 14.2 07/21/2016    HCT 42.5 07/21/2016    MCV 91 07/21/2016     07/21/2016       Test(s) Reviewed  I have reviewed the following in detail:  [x] Stress test   [] Angiography   [] Echocardiogram   [x] Labs   [] Other:       Assessment:         ICD-10-CM ICD-9-CM   1. Non-rheumatic mitral regurgitation I34.0 424.0   2. PVC's (premature ventricular contractions) I49.3 427.69   3. Drug side effects, initial encounter T50.905A E947.9   4. Hypercholesterolemia E78.00 272.0   5. Family history of premature CAD Z82.49 V17.3     Problem List Items Addressed This Visit        Cardiac/Vascular    Non-rheumatic mitral regurgitation - Primary    Relevant Orders    Comprehensive metabolic panel    PVC's (premature ventricular contractions)    Relevant Orders    Comprehensive metabolic panel    Hypercholesterolemia    Relevant Orders    Lipid panel    Comprehensive metabolic panel       Other    Family history of premature CAD    Relevant Orders    Comprehensive metabolic panel    Drug side effects, initial encounter    Relevant Orders    Comprehensive metabolic panel           Plan:     LOW DOSE BB FOR SX, CHANGE PRAVACHOL TO CRESTOR  5 MG,.ALL CV CLINICALLY STABLE, NO ANGINA, NO HF, NO TIA, NO SIGNIFICANT CLINICAL ARRHYTHMIA,CONTINUE CURRENT MEDS, EDUCATION, DIET, EXERCISE, RTC IN 6 MO WITH LABS      Non-rheumatic mitral regurgitation  -     Comprehensive metabolic  panel; Future; Expected date: 02/06/2019    PVC's (premature ventricular contractions)  -     Comprehensive metabolic panel; Future; Expected date: 02/06/2019    Drug side effects, initial encounter  -     Comprehensive metabolic panel; Future; Expected date: 02/06/2019    Hypercholesterolemia  -     Lipid panel; Future; Expected date: 08/06/2019  -     Comprehensive metabolic panel; Future; Expected date: 02/06/2019    Family history of premature CAD  -     Comprehensive metabolic panel; Future; Expected date: 02/06/2019    Other orders  -     metoprolol succinate (TOPROL-XL) 25 MG 24 hr tablet; Take 0.5 tablets (12.5 mg total) by mouth once daily.  Dispense: 45 tablet; Refill: 1  -     rosuvastatin (CRESTOR) 10 MG tablet; Take 1 tablet (10 mg total) by mouth once daily.  Dispense: 90 tablet; Refill: 1    RTC Low level/low impact aerobic exercise 5x's/wk. Heart healthy diet and risk factor modification.    See labs and med orders.    Aerobic exercise 5x's/wk. Heart healthy diet and risk factor modification.    See labs and med orders.

## 2019-04-10 ENCOUNTER — OFFICE VISIT (OUTPATIENT)
Dept: CARDIOLOGY | Facility: CLINIC | Age: 39
End: 2019-04-10
Payer: COMMERCIAL

## 2019-04-10 VITALS
SYSTOLIC BLOOD PRESSURE: 138 MMHG | OXYGEN SATURATION: 97 % | HEIGHT: 75 IN | BODY MASS INDEX: 29.03 KG/M2 | HEART RATE: 80 BPM | WEIGHT: 233.44 LBS | DIASTOLIC BLOOD PRESSURE: 70 MMHG

## 2019-04-10 DIAGNOSIS — R03.0 ELEVATED BP WITHOUT DIAGNOSIS OF HYPERTENSION: ICD-10-CM

## 2019-04-10 DIAGNOSIS — R42 DIZZINESS: Primary | ICD-10-CM

## 2019-04-10 DIAGNOSIS — M25.512 LEFT SHOULDER PAIN, UNSPECIFIED CHRONICITY: ICD-10-CM

## 2019-04-10 DIAGNOSIS — I49.3 PVC'S (PREMATURE VENTRICULAR CONTRACTIONS): ICD-10-CM

## 2019-04-10 DIAGNOSIS — R09.89 BRUIT (ARTERIAL): ICD-10-CM

## 2019-04-10 PROCEDURE — 93000 EKG 12-LEAD: ICD-10-PCS | Mod: S$GLB,,, | Performed by: INTERNAL MEDICINE

## 2019-04-10 PROCEDURE — 3008F PR BODY MASS INDEX (BMI) DOCUMENTED: ICD-10-PCS | Mod: CPTII,S$GLB,, | Performed by: INTERNAL MEDICINE

## 2019-04-10 PROCEDURE — 3008F BODY MASS INDEX DOCD: CPT | Mod: CPTII,S$GLB,, | Performed by: INTERNAL MEDICINE

## 2019-04-10 PROCEDURE — 99213 OFFICE O/P EST LOW 20 MIN: CPT | Mod: S$GLB,,, | Performed by: INTERNAL MEDICINE

## 2019-04-10 PROCEDURE — 93000 ELECTROCARDIOGRAM COMPLETE: CPT | Mod: S$GLB,,, | Performed by: INTERNAL MEDICINE

## 2019-04-10 PROCEDURE — 99213 PR OFFICE/OUTPT VISIT, EST, LEVL III, 20-29 MIN: ICD-10-PCS | Mod: S$GLB,,, | Performed by: INTERNAL MEDICINE

## 2019-04-10 NOTE — PROGRESS NOTES
Subjective:    Patient ID:  Jaren Breen is a 39 y.o. male who presents for Dizziness (Left shoulder pain) and Hypertension        HPI  REQUESTED OV,C/O DIZZINESS, SAW DR DUDLEY ENT, NEGATIVE W/U, ALSO L SHOULDER PAIN, NOT RELATED, SEE ROS    Past Medical History:   Diagnosis Date    Heart murmur     Hypercholesterolemia 2018    Stomach ulcer     Valvular regurgitation      Past Surgical History:   Procedure Laterality Date    CARDIAC CATHETERIZATION      ESOPHAGOGASTRODUODENOSCOPY       Family History   Problem Relation Age of Onset    Heart disease Mother     Hypertension Father      Social History     Socioeconomic History    Marital status:      Spouse name: Not on file    Number of children: Not on file    Years of education: Not on file    Highest education level: Not on file   Occupational History    Not on file   Social Needs    Financial resource strain: Not on file    Food insecurity:     Worry: Not on file     Inability: Not on file    Transportation needs:     Medical: Not on file     Non-medical: Not on file   Tobacco Use    Smoking status: Former Smoker     Start date:      Last attempt to quit:      Years since quittin.2    Smokeless tobacco: Never Used   Substance and Sexual Activity    Alcohol use: Yes     Comment: Occasional    Drug use: No    Sexual activity: Not on file   Lifestyle    Physical activity:     Days per week: Not on file     Minutes per session: Not on file    Stress: Not on file   Relationships    Social connections:     Talks on phone: Not on file     Gets together: Not on file     Attends [employer] service: Not on file     Active member of club or organization: Not on file     Attends meetings of clubs or organizations: Not on file     Relationship status: Not on file   Other Topics Concern    Not on file   Social History Narrative    Not on file       Review of patient's allergies indicates:  No Known Allergies    Current  Outpatient Medications:     aspirin 81 MG Chew, 81 mg every other day. , Disp: , Rfl:     metoprolol succinate (TOPROL-XL) 25 MG 24 hr tablet, Take 0.5 tablets (12.5 mg total) by mouth once daily., Disp: 45 tablet, Rfl: 1    pantoprazole (PROTONIX) 40 MG tablet, Take 40 mg by mouth once daily., Disp: , Rfl:     rosuvastatin (CRESTOR) 10 MG tablet, Take 1 tablet (10 mg total) by mouth once daily., Disp: 90 tablet, Rfl: 1    sucralfate (CARAFATE) 1 gram tablet, Take 1 g by mouth once daily. , Disp: , Rfl:     Review of Systems   Constitution: Negative for chills, diaphoresis, fever, malaise/fatigue (SOME) and night sweats.   HENT: Negative for congestion and nosebleeds.    Eyes: Negative for blurred vision and visual disturbance.   Cardiovascular: Negative for chest pain, claudication, cyanosis, dyspnea on exertion (SOME), irregular heartbeat (SOME), leg swelling, near-syncope, orthopnea, palpitations, paroxysmal nocturnal dyspnea and syncope.   Respiratory: Negative for cough, hemoptysis, shortness of breath and wheezing.    Endocrine: Negative for cold intolerance, heat intolerance and polyuria.   Hematologic/Lymphatic: Negative for adenopathy. Does not bruise/bleed easily.   Skin: Negative for color change, itching and nail changes.   Musculoskeletal: Negative for back pain (CHRONIC), falls, joint pain (SHOULDERS) and myalgias (L CALF, WITH PROLONGED SITTING).   Gastrointestinal: Negative for abdominal pain, change in bowel habit, dysphagia, heartburn (STABLE GERD), hematemesis, jaundice, melena and vomiting.   Genitourinary: Negative for dysuria, flank pain and frequency.   Neurological: Positive for dizziness (CONSTANT, NOT NOTICED WHEN BUSY/ WORKING). Negative for brief paralysis, focal weakness, light-headedness, loss of balance, tremors and weakness. Numbness: OCC.   Psychiatric/Behavioral: Negative for altered mental status and depression. The patient is not nervous/anxious.    Allergic/Immunologic:  "Negative for hives and persistent infections.        Objective:      Vitals:    04/10/19 1405   BP: 138/70   Pulse: 80   SpO2: 97%   Weight: 105.9 kg (233 lb 7.5 oz)   Height: 6' 3" (1.905 m)   PainSc: 0-No pain     Body mass index is 29.18 kg/m².    Physical Exam   Constitutional: He is oriented to person, place, and time. He appears well-developed and well-nourished. He is active.   HENT:   Head: Normocephalic and atraumatic.   Mouth/Throat: Oropharynx is clear and moist and mucous membranes are normal.   Eyes: Pupils are equal, round, and reactive to light. Conjunctivae and EOM are normal.   Neck: Normal range of motion. Neck supple. Normal carotid pulses, no hepatojugular reflux and no JVD present. Carotid bruit is not present. No edema and no erythema present. No thyromegaly present.   Cardiovascular: Normal rate and regular rhythm.  No extrasystoles are present. PMI is not displaced. Exam reveals no gallop, no distant heart sounds, no friction rub and no midsystolic click.   Murmur heard.  High-pitched holosystolic murmur is present at the apex.  Pulses:       Carotid pulses are 2+ on the right side, and 2+ on the left side with bruit.       Radial pulses are 2+ on the right side, and 2+ on the left side.        Femoral pulses are 2+ on the right side, and 2+ on the left side.       Dorsalis pedis pulses are 2+ on the right side, and 2+ on the left side.        Posterior tibial pulses are 2+ on the right side, and 2+ on the left side.   Pulmonary/Chest: Effort normal and breath sounds normal. No accessory muscle usage. No tachypnea and no bradypnea. No respiratory distress.   Abdominal: Soft. Bowel sounds are normal. He exhibits no distension and no mass. There is no hepatosplenomegaly. There is no CVA tenderness.   Musculoskeletal: Normal range of motion. He exhibits no edema or deformity.   Lymphadenopathy:     He has no cervical adenopathy.   Neurological: He is alert and oriented to person, place, and " time. He has normal strength. He displays no tremor. No cranial nerve deficit.   Skin: Skin is warm and dry. No cyanosis or erythema. No pallor.   Psychiatric: He has a normal mood and affect. His speech is normal and behavior is normal.               ..    Chemistry        Component Value Date/Time     07/21/2016 1501    K 4.1 07/21/2016 1501     07/21/2016 1501    CO2 27 07/21/2016 1501    BUN 15 07/21/2016 1501    CREATININE 1.02 07/21/2016 1501    GLU 95 07/21/2016 1501        Component Value Date/Time    CALCIUM 9.0 07/21/2016 1501    ALKPHOS 46 07/21/2016 1501    AST 32 07/21/2016 1501    AST 31 05/11/2016 1820    ALT 36 07/21/2016 1501    BILITOT 0.5 07/21/2016 1501    ESTGFRAFRICA >60 07/21/2016 1501    EGFRNONAA >60 07/21/2016 1501            ..No results found for: CHOL  No results found for: HDL  No results found for: LDLCALC  No results found for: TRIG  No results found for: CHOLHDL  ..  Lab Results   Component Value Date    WBC 6.77 07/21/2016    HGB 14.2 07/21/2016    HCT 42.5 07/21/2016    MCV 91 07/21/2016     07/21/2016       Test(s) Reviewed  I have reviewed the following in detail:  [] Stress test   [] Angiography   [] Echocardiogram   [] Labs   [x] Other:       Assessment:         ICD-10-CM ICD-9-CM   1. Dizziness R42 780.4   2. Left shoulder pain, unspecified chronicity M25.512 719.41   3. Elevated BP without diagnosis of hypertension R03.0 796.2   4. Bruit (arterial) R09.89 785.9   5. PVC's (premature ventricular contractions) I49.3 427.69     Problem List Items Addressed This Visit        Cardiac/Vascular    PVC's (premature ventricular contractions)    Elevated BP without diagnosis of hypertension       Orthopedic    Left shoulder pain       Other    Dizziness - Primary    Relevant Orders    CV Ultrasound Bilateral Doppler Carotid    Bruit (arterial)    Relevant Orders    CV Ultrasound Bilateral Doppler Carotid           Plan:     EKG SR WITH SINUS ARRHYTHMIA, WATCH BP,  CHECK CAROTID US,  ??? BRUIT HYDRATION, DOUBT CARDIAC ISSUE,, L SHOULDER/ ORTHO, MILD, ALL OTHER CV CLINICALLY STABLE, NO ANGINA, NO HF, NO TIA, NO CLINICAL ARRHYTHMIA,CONTINUE CURRENT MEDS, EDUCATION, DIET, EXERCISE ,RTC AS SCHEDULED      Dizziness  -     CV Ultrasound Bilateral Doppler Carotid; Future    Left shoulder pain, unspecified chronicity    Elevated BP without diagnosis of hypertension    Bruit (arterial)  -     CV Ultrasound Bilateral Doppler Carotid; Future    PVC's (premature ventricular contractions)  -     SCHEDULED EKG 12-LEAD (to Muse)    RTC Low level/low impact aerobic exercise 5x's/wk. Heart healthy diet and risk factor modification.    See labs and med orders.    Aerobic exercise 5x's/wk. Heart healthy diet and risk factor modification.    See labs and med orders.

## 2019-04-11 DIAGNOSIS — I49.3 PVC'S (PREMATURE VENTRICULAR CONTRACTIONS): Primary | ICD-10-CM

## 2019-04-16 ENCOUNTER — TELEPHONE (OUTPATIENT)
Dept: CARDIOLOGY | Facility: CLINIC | Age: 39
End: 2019-04-16

## 2019-04-17 ENCOUNTER — CLINICAL SUPPORT (OUTPATIENT)
Dept: CARDIOLOGY | Facility: CLINIC | Age: 39
End: 2019-04-17
Attending: INTERNAL MEDICINE
Payer: COMMERCIAL

## 2019-04-17 DIAGNOSIS — R09.89 BRUIT (ARTERIAL): ICD-10-CM

## 2019-04-17 DIAGNOSIS — R42 DIZZINESS: ICD-10-CM

## 2019-04-17 LAB
LEFT ARM DIASTOLIC BLOOD PRESSURE: 67 MMHG
LEFT ARM SYSTOLIC BLOOD PRESSURE: 120 MMHG
LEFT CBA DIAS: 10 CM/S
LEFT CBA SYS: 77 CM/S
LEFT CCA DIST DIAS: 9 CM/S
LEFT CCA DIST SYS: 103 CM/S
LEFT CCA MID DIAS: 13 CM/S
LEFT CCA MID SYS: 102 CM/S
LEFT CCA PROX DIAS: 11 CM/S
LEFT CCA PROX SYS: 122 CM/S
LEFT ECA DIAS: 11 CM/S
LEFT ECA SYS: 91 CM/S
LEFT ICA DIST DIAS: 18 CM/S
LEFT ICA DIST SYS: 65 CM/S
LEFT ICA MID DIAS: 18 CM/S
LEFT ICA MID SYS: 83 CM/S
LEFT ICA PROX DIAS: 10 CM/S
LEFT ICA PROX SYS: 43 CM/S
LEFT VERTEBRAL DIAS: 8 CM/S
LEFT VERTEBRAL SYS: 51 CM/S
OHS CV CAROTID RIGHT ICA EDV HIGHEST: 23
OHS CV CAROTID ULTRASOUND LEFT ICA/CCA RATIO: 0.68
OHS CV CAROTID ULTRASOUND RIGHT ICA/CCA RATIO: 0.5
OHS CV PV CAROTID LEFT HIGHEST CCA: 122
OHS CV PV CAROTID LEFT HIGHEST ICA: 83
OHS CV PV CAROTID RIGHT HIGHEST CCA: 140
OHS CV PV CAROTID RIGHT HIGHEST ICA: 70
OHS CV US CAROTID LEFT HIGHEST EDV: 18
RIGHT ARM DIASTOLIC BLOOD PRESSURE: 70 MMHG
RIGHT ARM SYSTOLIC BLOOD PRESSURE: 120 MMHG
RIGHT CBA DIAS: 11 CM/S
RIGHT CBA SYS: 103 CM/S
RIGHT CCA DIST DIAS: 11 CM/S
RIGHT CCA DIST SYS: 76 CM/S
RIGHT CCA MID DIAS: 11 CM/S
RIGHT CCA MID SYS: 118 CM/S
RIGHT CCA PROX DIAS: 15 CM/S
RIGHT CCA PROX SYS: 140 CM/S
RIGHT ECA DIAS: 6 CM/S
RIGHT ECA SYS: 84 CM/S
RIGHT ICA DIST DIAS: 21 CM/S
RIGHT ICA DIST SYS: 70 CM/S
RIGHT ICA MID DIAS: 23 CM/S
RIGHT ICA MID SYS: 67 CM/S
RIGHT ICA PROX DIAS: 13 CM/S
RIGHT ICA PROX SYS: 58 CM/S
RIGHT VERTEBRAL DIAS: 15 CM/S
RIGHT VERTEBRAL SYS: 58 CM/S

## 2019-04-17 PROCEDURE — 93880 EXTRACRANIAL BILAT STUDY: CPT | Mod: S$GLB,,, | Performed by: INTERNAL MEDICINE

## 2019-04-17 PROCEDURE — 93880 CV US DOPPLER CAROTID (CUPID ONLY): ICD-10-PCS | Mod: S$GLB,,, | Performed by: INTERNAL MEDICINE

## 2019-07-10 ENCOUNTER — OFFICE VISIT (OUTPATIENT)
Dept: CARDIOLOGY | Facility: CLINIC | Age: 39
End: 2019-07-10
Payer: COMMERCIAL

## 2019-07-10 VITALS
HEART RATE: 67 BPM | BODY MASS INDEX: 29.2 KG/M2 | DIASTOLIC BLOOD PRESSURE: 70 MMHG | SYSTOLIC BLOOD PRESSURE: 116 MMHG | WEIGHT: 234.81 LBS | OXYGEN SATURATION: 98 % | HEIGHT: 75 IN

## 2019-07-10 DIAGNOSIS — E78.00 HYPERCHOLESTEROLEMIA: ICD-10-CM

## 2019-07-10 DIAGNOSIS — R07.2 PRECORDIAL PAIN: Primary | ICD-10-CM

## 2019-07-10 DIAGNOSIS — I49.3 PVC'S (PREMATURE VENTRICULAR CONTRACTIONS): ICD-10-CM

## 2019-07-10 DIAGNOSIS — I34.0 NON-RHEUMATIC MITRAL REGURGITATION: ICD-10-CM

## 2019-07-10 DIAGNOSIS — Z82.49 FAMILY HISTORY OF PREMATURE CAD: ICD-10-CM

## 2019-07-10 DIAGNOSIS — Z91.89 AT RISK FOR CORONARY ARTERY DISEASE: ICD-10-CM

## 2019-07-10 PROCEDURE — 3008F BODY MASS INDEX DOCD: CPT | Mod: CPTII,S$GLB,, | Performed by: INTERNAL MEDICINE

## 2019-07-10 PROCEDURE — 3008F PR BODY MASS INDEX (BMI) DOCUMENTED: ICD-10-PCS | Mod: CPTII,S$GLB,, | Performed by: INTERNAL MEDICINE

## 2019-07-10 PROCEDURE — 99214 PR OFFICE/OUTPT VISIT, EST, LEVL IV, 30-39 MIN: ICD-10-PCS | Mod: S$GLB,,, | Performed by: INTERNAL MEDICINE

## 2019-07-10 PROCEDURE — 99214 OFFICE O/P EST MOD 30 MIN: CPT | Mod: S$GLB,,, | Performed by: INTERNAL MEDICINE

## 2019-07-10 NOTE — PROGRESS NOTES
Subjective:    Patient ID:  Jaren Breen is a 39 y.o. male who presents for Dizziness (Carotid ); Chest Pain; and Valvular Heart Disease        HPI  DISCUSSED CAROTID US, MILD IT, INTERMITTENT CP,RECENT CBC, CMP OK, , HAD MRI PER NEUROLOGY DR FERNANDEZ FOR DIZZINESS, WAS OK,  NOT TAKING CRESTOR, FEELS HEART PULSATING IN SPINE WITH STOMACH ISSUES WHEN OFF PROTONIX, REMAINS IS DIFFERENT COMPLAINTS, SEE ROS    Past Medical History:   Diagnosis Date    Heart murmur     Hypercholesterolemia 2018    Stomach ulcer     Valvular regurgitation      Past Surgical History:   Procedure Laterality Date    CARDIAC CATHETERIZATION      ESOPHAGOGASTRODUODENOSCOPY       Family History   Problem Relation Age of Onset    Heart disease Mother     Hypertension Father      Social History     Socioeconomic History    Marital status:      Spouse name: Not on file    Number of children: Not on file    Years of education: Not on file    Highest education level: Not on file   Occupational History    Not on file   Social Needs    Financial resource strain: Not on file    Food insecurity:     Worry: Not on file     Inability: Not on file    Transportation needs:     Medical: Not on file     Non-medical: Not on file   Tobacco Use    Smoking status: Former Smoker     Start date:      Last attempt to quit: 2013     Years since quittin.5    Smokeless tobacco: Never Used   Substance and Sexual Activity    Alcohol use: Yes     Comment: Occasional    Drug use: No    Sexual activity: Not on file   Lifestyle    Physical activity:     Days per week: Not on file     Minutes per session: Not on file    Stress: Not on file   Relationships    Social connections:     Talks on phone: Not on file     Gets together: Not on file     Attends L[employer] day service: Not on file     Active member of club or organization: Not on file     Attends meetings of clubs or organizations: Not on file     Relationship status:  Not on file   Other Topics Concern    Not on file   Social History Narrative    Not on file       Review of patient's allergies indicates:  No Known Allergies    Current Outpatient Medications:     aspirin 81 MG Chew, 81 mg once daily. , Disp: , Rfl:     pantoprazole (PROTONIX) 40 MG tablet, Take 40 mg by mouth once daily., Disp: , Rfl:     sucralfate (CARAFATE) 1 gram tablet, Take 1 g by mouth once daily. , Disp: , Rfl:     metoprolol succinate (TOPROL-XL) 25 MG 24 hr tablet, Take 0.5 tablets (12.5 mg total) by mouth once daily., Disp: 45 tablet, Rfl: 1    rosuvastatin (CRESTOR) 10 MG tablet, Take 1 tablet (10 mg total) by mouth once daily., Disp: 90 tablet, Rfl: 1    Review of Systems   Constitution: Negative for chills, diaphoresis, fever, malaise/fatigue (SOME) and night sweats.   HENT: Negative for congestion and nosebleeds.    Eyes: Negative for blurred vision and visual disturbance.   Cardiovascular: Negative for chest pain (INTERMITTENT, SHARP, FLEETING, FEW SEC,BETTER WITH ASA), claudication, cyanosis, dyspnea on exertion (SOME), irregular heartbeat (SOME), leg swelling, near-syncope, orthopnea, palpitations, paroxysmal nocturnal dyspnea and syncope.   Respiratory: Negative for cough, hemoptysis, shortness of breath and wheezing.    Endocrine: Negative for cold intolerance, heat intolerance and polyuria.   Hematologic/Lymphatic: Negative for adenopathy. Does not bruise/bleed easily.   Skin: Negative for color change, itching and nail changes.   Musculoskeletal: Negative for back pain (CHRONIC), falls, joint pain (SHOULDERS) and myalgias (L CALF, WITH PROLONGED SITTING).   Gastrointestinal: Negative for abdominal pain, change in bowel habit, dysphagia, heartburn (STABLE GERD), hematemesis, jaundice, melena and vomiting.   Genitourinary: Negative for dysuria, flank pain and frequency.   Neurological: Negative for brief paralysis, dizziness (INTERMITTENT), focal weakness, light-headedness, loss of  "balance, tremors and weakness. Numbness: OCC.   Psychiatric/Behavioral: Negative for altered mental status and depression. The patient is not nervous/anxious.    Allergic/Immunologic: Negative for hives and persistent infections.        Objective:      Vitals:    07/10/19 0815   BP: 116/70   Pulse: 67   SpO2: 98%   Weight: 106.5 kg (234 lb 12.6 oz)   Height: 6' 3" (1.905 m)   PainSc: 0-No pain     Body mass index is 29.35 kg/m².    Physical Exam   Constitutional: He is oriented to person, place, and time. He appears well-developed and well-nourished. He is active.   HENT:   Head: Normocephalic and atraumatic.   Mouth/Throat: Oropharynx is clear and moist and mucous membranes are normal.   Eyes: Pupils are equal, round, and reactive to light. Conjunctivae and EOM are normal.   Neck: Normal range of motion. Neck supple. Normal carotid pulses, no hepatojugular reflux and no JVD present. Carotid bruit is not present. No edema and no erythema present. No thyromegaly present.   Cardiovascular: Normal rate and regular rhythm.  No extrasystoles are present. PMI is not displaced. Exam reveals no gallop, no distant heart sounds, no friction rub and no midsystolic click.   Murmur heard.  High-pitched holosystolic murmur is present at the apex.  Pulses:       Carotid pulses are 2+ on the right side, and 2+ on the left side with bruit.       Radial pulses are 2+ on the right side, and 2+ on the left side.        Femoral pulses are 2+ on the right side, and 2+ on the left side.       Dorsalis pedis pulses are 2+ on the right side, and 2+ on the left side.        Posterior tibial pulses are 2+ on the right side, and 2+ on the left side.   Pulmonary/Chest: Effort normal and breath sounds normal. No accessory muscle usage. No tachypnea and no bradypnea. No respiratory distress.   Abdominal: Soft. Bowel sounds are normal. He exhibits no distension and no mass. There is no hepatosplenomegaly. There is no CVA tenderness. "   Musculoskeletal: Normal range of motion. He exhibits no edema or deformity.   Lymphadenopathy:     He has no cervical adenopathy.   Neurological: He is alert and oriented to person, place, and time. He has normal strength. He displays no tremor. No cranial nerve deficit.   Skin: Skin is warm and dry. No cyanosis or erythema. No pallor.   Psychiatric: He has a normal mood and affect. His speech is normal and behavior is normal.               ..    Chemistry        Component Value Date/Time     07/21/2016 1501    K 4.1 07/21/2016 1501     07/21/2016 1501    CO2 27 07/21/2016 1501    BUN 15 07/21/2016 1501    CREATININE 1.02 07/21/2016 1501    GLU 95 07/21/2016 1501        Component Value Date/Time    CALCIUM 9.0 07/21/2016 1501    ALKPHOS 46 07/21/2016 1501    AST 32 07/21/2016 1501    AST 31 05/11/2016 1820    ALT 36 07/21/2016 1501    BILITOT 0.5 07/21/2016 1501    ESTGFRAFRICA >60 07/21/2016 1501    EGFRNONAA >60 07/21/2016 1501            ..No results found for: CHOL  No results found for: HDL  No results found for: LDLCALC  No results found for: TRIG  No results found for: CHOLHDL  ..  Lab Results   Component Value Date    WBC 6.77 07/21/2016    HGB 14.2 07/21/2016    HCT 42.5 07/21/2016    MCV 91 07/21/2016     07/21/2016       Test(s) Reviewed  I have reviewed the following in detail:  [] Stress test   [] Angiography   [] Echocardiogram   [x] Labs   [x] Other:       Assessment:         ICD-10-CM ICD-9-CM   1. Precordial pain R07.2 786.51   2. Non-rheumatic mitral regurgitation I34.0 424.0   3. PVC's (premature ventricular contractions) I49.3 427.69   4. At risk for coronary artery disease Z91.89 V49.89   5. Family history of premature CAD Z82.49 V17.3   6. Hypercholesterolemia E78.00 272.0     Problem List Items Addressed This Visit        Cardiac/Vascular    Non-rheumatic mitral regurgitation    PVC's (premature ventricular contractions)    Hypercholesterolemia    Relevant Orders    Lipid  panel    At risk for coronary artery disease    Relevant Orders    CT Cardiac Scoring       Other    Family history of premature CAD    Precordial pain - Primary           Plan:     RE-START CRESTOR IN VIEW OF DYSLIPIDEMIA AND SIGNIFICANT FAMILY HISTORY OF PREMATURE CORONARY ARTERY DISEASE, WILL CHECK CA SCORE, ALL CV CLINICALLY RELATIVELY STABLE, NO TRUE ANGINA, NO HF, NO TIA, NO CLINICAL ARRHYTHMIA,CONTINUE CURRENT MEDS, EDUCATION, DIET, EXERCISE, RTC IN 3 MO      Precordial pain    Non-rheumatic mitral regurgitation    PVC's (premature ventricular contractions)    At risk for coronary artery disease  -     CT Cardiac Scoring; Future; Expected date: 07/10/2019    Family history of premature CAD    Hypercholesterolemia  -     Lipid panel; Future; Expected date: 07/10/2019    RTC Low level/low impact aerobic exercise 5x's/wk. Heart healthy diet and risk factor modification.    See labs and med orders.    Aerobic exercise 5x's/wk. Heart healthy diet and risk factor modification.    See labs and med orders.

## 2019-07-15 ENCOUNTER — TELEPHONE (OUTPATIENT)
Dept: CARDIOLOGY | Facility: CLINIC | Age: 39
End: 2019-07-15

## 2019-07-15 ENCOUNTER — HOSPITAL ENCOUNTER (OUTPATIENT)
Dept: RADIOLOGY | Facility: HOSPITAL | Age: 39
Discharge: HOME | End: 2019-07-15
Attending: INTERNAL MEDICINE
Payer: COMMERCIAL

## 2019-07-15 DIAGNOSIS — Z91.89 AT RISK FOR CORONARY ARTERY DISEASE: ICD-10-CM

## 2019-07-15 PROCEDURE — 75571 CT CALCIUM SCORING CARDIAC: ICD-10-PCS | Mod: 26,,, | Performed by: RADIOLOGY

## 2019-07-15 PROCEDURE — 75571 CT HRT W/O DYE W/CA TEST: CPT | Mod: TC,PO

## 2019-07-15 PROCEDURE — 75571 CT HRT W/O DYE W/CA TEST: CPT | Mod: 26,,, | Performed by: RADIOLOGY

## 2019-07-15 NOTE — TELEPHONE ENCOUNTER
Spoke to patient, stated he had chest pain Thursday night and went to ED and stayed over night. Advised that I will send a [facility] request to get records and have Dr. Milner review. Patient verbalized understanding.

## 2019-07-15 NOTE — TELEPHONE ENCOUNTER
----- Message from Armani Hilario sent at 7/15/2019  8:18 AM CDT -----  Contact: pt  The Pt is requesting a call back regarding his overnight stay in the hosp and some questions about that. Please call to advise.     Call Back#: [phone]  Thanks

## 2019-07-16 ENCOUNTER — TELEPHONE (OUTPATIENT)
Dept: CARDIOLOGY | Facility: CLINIC | Age: 39
End: 2019-07-16

## 2019-07-16 NOTE — TELEPHONE ENCOUNTER
----- Message from Gianni Milner MD sent at 7/15/2019 11:38 AM CDT -----  Imaging tests results are within normal parameters.  Keep your follow up appointment.

## 2019-09-10 RX ORDER — ROSUVASTATIN CALCIUM 10 MG/1
10 TABLET, COATED ORAL DAILY
Qty: 90 TABLET | Refills: 0 | Status: SHIPPED | OUTPATIENT
Start: 2019-09-10 | End: 2020-02-28 | Stop reason: CLARIF

## 2019-09-10 NOTE — TELEPHONE ENCOUNTER
----- Message from Shonna Leyva sent at 9/10/2019  9:41 AM CDT -----    Type:  RX Refill Request    Who Called:    New Rx:    RX Name and Strength:   Pt needs a cholesterol  med  Preferred Pharmacy with phone number:    [id number] - [place], [place] - [address] [place] [id number]  Phone: [phone] Fax: [phone]  Best Call Back Number:  [phone]  Additional Information:   Please call  For details

## 2019-10-28 ENCOUNTER — OFFICE VISIT (OUTPATIENT)
Dept: CARDIOLOGY | Facility: CLINIC | Age: 39
End: 2019-10-28
Payer: COMMERCIAL

## 2019-10-28 VITALS
DIASTOLIC BLOOD PRESSURE: 69 MMHG | HEIGHT: 75 IN | SYSTOLIC BLOOD PRESSURE: 110 MMHG | WEIGHT: 232.81 LBS | HEART RATE: 91 BPM | BODY MASS INDEX: 28.95 KG/M2

## 2019-10-28 DIAGNOSIS — R93.1 AGATSTON CAC SCORE, <100: ICD-10-CM

## 2019-10-28 DIAGNOSIS — E78.00 HYPERCHOLESTEROLEMIA: ICD-10-CM

## 2019-10-28 DIAGNOSIS — R73.01 ELEVATED FASTING BLOOD SUGAR: ICD-10-CM

## 2019-10-28 DIAGNOSIS — I34.0 NON-RHEUMATIC MITRAL REGURGITATION: ICD-10-CM

## 2019-10-28 DIAGNOSIS — I49.3 PVC'S (PREMATURE VENTRICULAR CONTRACTIONS): Primary | ICD-10-CM

## 2019-10-28 DIAGNOSIS — R03.0 ELEVATED BP WITHOUT DIAGNOSIS OF HYPERTENSION: ICD-10-CM

## 2019-10-28 PROCEDURE — 99999 PR PBB SHADOW E&M-EST. PATIENT-LVL III: CPT | Mod: PBBFAC,,, | Performed by: INTERNAL MEDICINE

## 2019-10-28 PROCEDURE — 99999 PR PBB SHADOW E&M-EST. PATIENT-LVL III: ICD-10-PCS | Mod: PBBFAC,,, | Performed by: INTERNAL MEDICINE

## 2019-10-28 PROCEDURE — 99213 PR OFFICE/OUTPT VISIT, EST, LEVL III, 20-29 MIN: ICD-10-PCS | Mod: S$GLB,,, | Performed by: INTERNAL MEDICINE

## 2019-10-28 PROCEDURE — 3008F BODY MASS INDEX DOCD: CPT | Mod: CPTII,S$GLB,, | Performed by: INTERNAL MEDICINE

## 2019-10-28 PROCEDURE — 99213 OFFICE O/P EST LOW 20 MIN: CPT | Mod: S$GLB,,, | Performed by: INTERNAL MEDICINE

## 2019-10-28 PROCEDURE — 3008F PR BODY MASS INDEX (BMI) DOCUMENTED: ICD-10-PCS | Mod: CPTII,S$GLB,, | Performed by: INTERNAL MEDICINE

## 2019-10-28 NOTE — PROGRESS NOTES
Subjective:    Patient ID:  Jaren Breen is a 39 y.o. male who presents for Valvular Heart Disease        HPI  DISCUSSED LABS AND GOALS CMP OK IN September, ,CA SCORE 0,  MULTIPLE SYMPTOMS WITH ANXIETY, RECURRENT HEADACHE, FOLLOWED BY NEUROLOGY, SEE ROS    Past Medical History:   Diagnosis Date    Heart murmur     Hypercholesterolemia 2018    Stomach ulcer     Valvular regurgitation      Past Surgical History:   Procedure Laterality Date    CARDIAC CATHETERIZATION      ESOPHAGOGASTRODUODENOSCOPY       Family History   Problem Relation Age of Onset    Heart disease Mother     Hypertension Father      Social History     Socioeconomic History    Marital status:      Spouse name: Not on file    Number of children: Not on file    Years of education: Not on file    Highest education level: Not on file   Occupational History    Not on file   Social Needs    Financial resource strain: Not on file    Food insecurity:     Worry: Not on file     Inability: Not on file    Transportation needs:     Medical: Not on file     Non-medical: Not on file   Tobacco Use    Smoking status: Former Smoker     Start date:      Last attempt to quit: 2013     Years since quittin.8    Smokeless tobacco: Never Used   Substance and Sexual Activity    Alcohol use: Yes     Comment: Occasional    Drug use: No    Sexual activity: Not on file   Lifestyle    Physical activity:     Days per week: Not on file     Minutes per session: Not on file    Stress: Not on file   Relationships    Social connections:     Talks on phone: Not on file     Gets together: Not on file     Attends [employer] service: Not on file     Active member of club or organization: Not on file     Attends meetings of clubs or organizations: Not on file     Relationship status: Not on file   Other Topics Concern    Not on file   Social History Narrative    Not on file       Review of patient's allergies indicates:  No Known  Allergies    Current Outpatient Medications:     aspirin 81 MG Chew, 81 mg once daily. , Disp: , Rfl:     metoprolol succinate (TOPROL-XL) 25 MG 24 hr tablet, Take 0.5 tablets (12.5 mg total) by mouth once daily., Disp: 45 tablet, Rfl: 1    pantoprazole (PROTONIX) 40 MG tablet, Take 40 mg by mouth once daily., Disp: , Rfl:     rosuvastatin (CRESTOR) 10 MG tablet, Take 1 tablet (10 mg total) by mouth once daily., Disp: 90 tablet, Rfl: 0    sucralfate (CARAFATE) 1 gram tablet, Take 1 g by mouth once daily. , Disp: , Rfl:     Review of Systems   Constitution: Negative for chills, diaphoresis, fever, malaise/fatigue (SOME) and night sweats.   HENT: Negative for congestion and nosebleeds.    Eyes: Negative for blurred vision and visual disturbance.   Cardiovascular: Negative for chest pain (RARE), claudication, cyanosis, dyspnea on exertion (SOME), irregular heartbeat (SOME), leg swelling, near-syncope, orthopnea, palpitations, paroxysmal nocturnal dyspnea and syncope.   Respiratory: Negative for cough, hemoptysis, shortness of breath and wheezing.    Endocrine: Negative for cold intolerance, heat intolerance and polyuria.   Hematologic/Lymphatic: Negative for bleeding problem. Does not bruise/bleed easily.   Skin: Negative for color change, itching and nail changes.   Musculoskeletal: Negative for back pain (CHRONIC), falls, joint pain (SHOULDERS) and myalgias (L CALF, WITH PROLONGED SITTING). Neck pain: SOME.   Gastrointestinal: Negative for abdominal pain, change in bowel habit, dysphagia, heartburn (STABLE GERD), hematemesis, jaundice, melena and vomiting.   Genitourinary: Negative for dysuria, flank pain and frequency (YES).   Neurological: Negative for brief paralysis, dizziness (INTERMITTENT), focal weakness, light-headedness, loss of balance, tremors and weakness. Headaches: RECURRENT. Numbness: OCC.   Psychiatric/Behavioral: Negative for altered mental status, depression and memory loss (SOME, SAW REBECCA IN  "THE PAST).   Allergic/Immunologic: Negative for hives and persistent infections.        Objective:      Vitals:    10/28/19 1458   BP: 110/69   Pulse: 91   Weight: 105.6 kg (232 lb 12.9 oz)   Height: 6' 3" (1.905 m)   PainSc:   4     Body mass index is 29.1 kg/m².    Physical Exam   Constitutional: He is oriented to person, place, and time. He appears well-developed and well-nourished. He is active.   HENT:   Head: Normocephalic and atraumatic.   Mouth/Throat: Oropharynx is clear and moist and mucous membranes are normal.   Eyes: Pupils are equal, round, and reactive to light. Conjunctivae and EOM are normal.   Neck: Normal range of motion. Neck supple. Normal carotid pulses, no hepatojugular reflux and no JVD present. Carotid bruit is not present. No edema and no erythema present. No thyromegaly present.   Cardiovascular: Normal rate and regular rhythm.  No extrasystoles are present. PMI is not displaced. Exam reveals no gallop, no distant heart sounds, no friction rub and no midsystolic click.   Murmur heard.  High-pitched holosystolic murmur is present at the apex.  Pulses:       Carotid pulses are 2+ on the right side, and 2+ on the left side with bruit.       Radial pulses are 2+ on the right side, and 2+ on the left side.        Femoral pulses are 2+ on the right side, and 2+ on the left side.       Dorsalis pedis pulses are 2+ on the right side, and 2+ on the left side.        Posterior tibial pulses are 2+ on the right side, and 2+ on the left side.   Pulmonary/Chest: Effort normal and breath sounds normal. No accessory muscle usage. No tachypnea and no bradypnea. No respiratory distress.   Abdominal: Soft. Bowel sounds are normal. He exhibits no distension and no mass. There is no hepatosplenomegaly. There is no CVA tenderness.   Musculoskeletal: Normal range of motion. He exhibits no edema or deformity.   Lymphadenopathy:     He has no cervical adenopathy.   Neurological: He is alert and oriented to " person, place, and time. He has normal strength. He displays no tremor. No cranial nerve deficit.   Skin: Skin is warm and dry. No cyanosis or erythema. No pallor.   Psychiatric: He has a normal mood and affect. His speech is normal and behavior is normal.               ..    Chemistry        Component Value Date/Time     09/10/2019 1420    K 3.9 09/10/2019 1420     09/10/2019 1420    CO2 26 09/10/2019 1420    BUN 15 09/10/2019 1420    CREATININE 0.94 09/10/2019 1420     09/10/2019 1420        Component Value Date/Time    CALCIUM 9.2 09/10/2019 1420    ALKPHOS 48 09/10/2019 1420    AST 25 09/10/2019 1420    AST 31 05/11/2016 1820    ALT 26 09/10/2019 1420    BILITOT 0.4 09/10/2019 1420    ESTGFRAFRICA >60 09/10/2019 1420    EGFRNONAA >60 09/10/2019 1420            ..No results found for: CHOL  No results found for: HDL  No results found for: LDLCALC  No results found for: TRIG  No results found for: CHOLHDL  ..  Lab Results   Component Value Date    WBC 6.21 09/10/2019    HGB 14.0 09/10/2019    HCT 41.5 09/10/2019    MCV 92 09/10/2019     09/10/2019       Test(s) Reviewed  I have reviewed the following in detail:  [] Stress test   [] Angiography   [] Echocardiogram   [x] Labs   [x] Other:       Assessment:         ICD-10-CM ICD-9-CM   1. PVC's (premature ventricular contractions) I49.3 427.69   2. Non-rheumatic mitral regurgitation I34.0 424.0   3. Elevated BP without diagnosis of hypertension R03.0 796.2   4. Agatston CAC score, <100 R93.1 793.2   5. Elevated fasting blood sugar R73.01 790.21   6. Hypercholesterolemia E78.00 272.0     Problem List Items Addressed This Visit        Cardiac/Vascular    Non-rheumatic mitral regurgitation    PVC's (premature ventricular contractions) - Primary    Hypercholesterolemia    Relevant Orders    Lipid panel    Elevated BP without diagnosis of hypertension    Agatston CAC score, <100       Endocrine    Elevated fasting blood sugar    Relevant Orders     Hemoglobin A1c           Plan:     ALL CV CLINICALLY STABLE, NO ANGINA, NO HF, NO TIA, NO SIGNIFICANT CLINICAL ARRHYTHMIA,CONTINUE CURRENT MEDS, EDUCATION, DIET, EXERCISE , F/U WITH NEUROLOGY, WATCH BLOOD PRESSURE, RETURN TO CLINIC IN FEW MONTHS WITH LABS      PVC's (premature ventricular contractions)    Non-rheumatic mitral regurgitation    Elevated BP without diagnosis of hypertension    Agatston CAC score, <100    Elevated fasting blood sugar  -     Hemoglobin A1c; Future; Expected date: 10/28/2019    Hypercholesterolemia  -     Lipid panel; Future; Expected date: 10/28/2019    RTC Low level/low impact aerobic exercise 5x's/wk. Heart healthy diet and risk factor modification.    See labs and med orders.    Aerobic exercise 5x's/wk. Heart healthy diet and risk factor modification.    See labs and med orders.

## 2020-02-12 ENCOUNTER — OFFICE VISIT (OUTPATIENT)
Dept: CARDIOLOGY | Facility: CLINIC | Age: 40
End: 2020-02-12
Payer: COMMERCIAL

## 2020-02-12 VITALS
WEIGHT: 226.44 LBS | OXYGEN SATURATION: 99 % | BODY MASS INDEX: 28.15 KG/M2 | HEIGHT: 75 IN | DIASTOLIC BLOOD PRESSURE: 66 MMHG | HEART RATE: 62 BPM | SYSTOLIC BLOOD PRESSURE: 116 MMHG

## 2020-02-12 DIAGNOSIS — R07.89 ATYPICAL CHEST PAIN: Primary | ICD-10-CM

## 2020-02-12 DIAGNOSIS — I49.3 PVC'S (PREMATURE VENTRICULAR CONTRACTIONS): ICD-10-CM

## 2020-02-12 DIAGNOSIS — I34.0 NON-RHEUMATIC MITRAL REGURGITATION: ICD-10-CM

## 2020-02-12 DIAGNOSIS — R06.02 SOB (SHORTNESS OF BREATH): ICD-10-CM

## 2020-02-12 DIAGNOSIS — R03.0 ELEVATED BP WITHOUT DIAGNOSIS OF HYPERTENSION: ICD-10-CM

## 2020-02-12 PROBLEM — R51.9 RECURRENT HEADACHE: Status: ACTIVE | Noted: 2020-02-12

## 2020-02-12 PROCEDURE — 3008F PR BODY MASS INDEX (BMI) DOCUMENTED: ICD-10-PCS | Mod: CPTII,S$GLB,, | Performed by: INTERNAL MEDICINE

## 2020-02-12 PROCEDURE — 99214 PR OFFICE/OUTPT VISIT, EST, LEVL IV, 30-39 MIN: ICD-10-PCS | Mod: S$GLB,,, | Performed by: INTERNAL MEDICINE

## 2020-02-12 PROCEDURE — 99214 OFFICE O/P EST MOD 30 MIN: CPT | Mod: S$GLB,,, | Performed by: INTERNAL MEDICINE

## 2020-02-12 PROCEDURE — 3008F BODY MASS INDEX DOCD: CPT | Mod: CPTII,S$GLB,, | Performed by: INTERNAL MEDICINE

## 2020-02-12 RX ORDER — METOPROLOL SUCCINATE 25 MG/1
12.5 TABLET, EXTENDED RELEASE ORAL DAILY
Qty: 45 TABLET | Refills: 1 | Status: SHIPPED | OUTPATIENT
Start: 2020-02-12 | End: 2020-08-12 | Stop reason: SDUPTHER

## 2020-02-12 NOTE — PROGRESS NOTES
Subjective:    Patient ID:  Jaren Breen is a 39 y.o. male who presents for Shortness of Breath; Chest Pain; and Hypertension        HPI    NO LABS/PT, BP OK, OCC R SIDED CP WITH HA, [facility], SHORTNESS OF BREATH, NEGATIVE W/U,CMP OK, RECENT ER FOR SOB, NEGATIVE FLU, CXR OK,  HAS SEEN DR FERNANDEZ FOR HA, NEGATIVE W/U,? NECK, MULTIPLE SYMPTOMS AND INCREASED ANXIETY, SEE ROS  Past Medical History:   Diagnosis Date    Heart murmur     Hypercholesterolemia 2018    Stomach ulcer     Valvular regurgitation      Past Surgical History:   Procedure Laterality Date    CARDIAC CATHETERIZATION      ESOPHAGOGASTRODUODENOSCOPY       Family History   Problem Relation Age of Onset    Heart disease Mother     Hypertension Father      Social History     Socioeconomic History    Marital status:      Spouse name: Not on file    Number of children: Not on file    Years of education: Not on file    Highest education level: Not on file   Occupational History    Not on file   Social Needs    Financial resource strain: Not on file    Food insecurity:     Worry: Not on file     Inability: Not on file    Transportation needs:     Medical: Not on file     Non-medical: Not on file   Tobacco Use    Smoking status: Former Smoker     Start date:      Last attempt to quit: 2013     Years since quittin.1    Smokeless tobacco: Never Used   Substance and Sexual Activity    Alcohol use: Yes     Comment: Occasional    Drug use: No    Sexual activity: Not on file   Lifestyle    Physical activity:     Days per week: Not on file     Minutes per session: Not on file    Stress: Not on file   Relationships    Social connections:     Talks on phone: Not on file     Gets together: Not on file     Attends [employer] service: Not on file     Active member of club or organization: Not on file     Attends meetings of clubs or organizations: Not on file     Relationship status: Not on file   Other  Topics Concern    Not on file   Social History Narrative    Not on file       Review of patient's allergies indicates:  No Known Allergies    Current Outpatient Medications:     aspirin 81 MG Chew, 81 mg once daily. , Disp: , Rfl:     pantoprazole (PROTONIX) 40 MG tablet, Take 40 mg by mouth once daily., Disp: , Rfl:     sucralfate (CARAFATE) 1 gram tablet, Take 1 g by mouth once daily. , Disp: , Rfl:     metoprolol succinate (TOPROL-XL) 25 MG 24 hr tablet, Take 0.5 tablets (12.5 mg total) by mouth once daily., Disp: 45 tablet, Rfl: 1    rosuvastatin (CRESTOR) 10 MG tablet, Take 1 tablet (10 mg total) by mouth once daily. (Patient not taking: Reported on 2/12/2020), Disp: 90 tablet, Rfl: 0    Review of Systems   Constitution: Negative for chills, diaphoresis, fever, malaise/fatigue (SOME) and night sweats.   HENT: Negative for congestion and nosebleeds.    Eyes: Negative for blurred vision and visual disturbance.   Cardiovascular: Negative for chest pain (RARE), claudication, cyanosis, dyspnea on exertion (SOME), irregular heartbeat (SOME), leg swelling, near-syncope, orthopnea, palpitations, paroxysmal nocturnal dyspnea and syncope.   Respiratory: Negative for cough, hemoptysis, shortness of breath and wheezing.    Endocrine: Negative for cold intolerance, heat intolerance and polyuria.   Hematologic/Lymphatic: Negative for bleeding problem. Does not bruise/bleed easily.   Skin: Negative for color change, itching and nail changes.   Musculoskeletal: Negative for back pain (CHRONIC), falls, joint pain (SHOULDERS) and myalgias (L CALF, WITH PROLONGED SITTING). Neck pain: SOME.   Gastrointestinal: Negative for abdominal pain, change in bowel habit, dysphagia, heartburn (STABLE ON MEDS), hematemesis, jaundice, melena and vomiting.   Genitourinary: Negative for dysuria, flank pain and frequency (YES).   Neurological: Negative for brief paralysis, dizziness, focal weakness, light-headedness, loss of balance,  "tremors and weakness. Headaches: RECURRENT. Numbness: OCC.   Psychiatric/Behavioral: Negative for altered mental status, depression and memory loss (SOME, SAW REBECCA IN THE PAST).   Allergic/Immunologic: Negative for hives and persistent infections.        Objective:      Vitals:    02/12/20 0805   BP: 116/66   Pulse: 62   SpO2: 99%   Weight: 102.7 kg (226 lb 6.6 oz)   Height: 6' 3" (1.905 m)   PainSc:   3     Body mass index is 28.3 kg/m².    Physical Exam   Constitutional: He is oriented to person, place, and time. He appears well-developed and well-nourished. He is active.   HENT:   Head: Normocephalic and atraumatic.   Mouth/Throat: Oropharynx is clear and moist and mucous membranes are normal.   Eyes: Pupils are equal, round, and reactive to light. Conjunctivae and EOM are normal.   Neck: Normal range of motion. Neck supple. Normal carotid pulses, no hepatojugular reflux and no JVD present. Carotid bruit is not present. No edema and no erythema present. No thyromegaly present.   Cardiovascular: Normal rate and regular rhythm.  No extrasystoles are present. PMI is not displaced. Exam reveals no gallop, no distant heart sounds, no friction rub and no midsystolic click.   Murmur heard.  High-pitched holosystolic murmur is present at the apex.  Pulses:       Carotid pulses are 2+ on the right side, and 2+ on the left side with bruit.       Radial pulses are 2+ on the right side, and 2+ on the left side.        Femoral pulses are 2+ on the right side, and 2+ on the left side.       Dorsalis pedis pulses are 2+ on the right side, and 2+ on the left side.        Posterior tibial pulses are 2+ on the right side, and 2+ on the left side.   Pulmonary/Chest: Effort normal and breath sounds normal. No accessory muscle usage. No tachypnea and no bradypnea. No respiratory distress.   Abdominal: Soft. Bowel sounds are normal. He exhibits no distension and no mass. There is no hepatosplenomegaly. There is no CVA tenderness. "   Musculoskeletal: Normal range of motion. He exhibits no edema or deformity.   Lymphadenopathy:     He has no cervical adenopathy.   Neurological: He is alert and oriented to person, place, and time. He has normal strength. He displays no tremor. No cranial nerve deficit.   Skin: Skin is warm and dry. No cyanosis or erythema. No pallor.   Psychiatric: He has a normal mood and affect. His speech is normal and behavior is normal.               ..    Chemistry        Component Value Date/Time     09/10/2019 1420    K 3.9 09/10/2019 1420     09/10/2019 1420    CO2 26 09/10/2019 1420    BUN 15 09/10/2019 1420    CREATININE 0.94 09/10/2019 1420     09/10/2019 1420        Component Value Date/Time    CALCIUM 9.2 09/10/2019 1420    ALKPHOS 48 09/10/2019 1420    AST 25 09/10/2019 1420    AST 31 05/11/2016 1820    ALT 26 09/10/2019 1420    BILITOT 0.4 09/10/2019 1420    ESTGFRAFRICA >60 09/10/2019 1420    EGFRNONAA >60 09/10/2019 1420            ..No results found for: CHOL  No results found for: HDL  No results found for: LDLCALC  No results found for: TRIG  No results found for: CHOLHDL  ..  Lab Results   Component Value Date    WBC 6.21 09/10/2019    HGB 14.0 09/10/2019    HCT 41.5 09/10/2019    MCV 92 09/10/2019     09/10/2019       Test(s) Reviewed  I have reviewed the following in detail:  [] Stress test   [] Angiography   [] Echocardiogram   [x] Labs   [x] Other:       Assessment:         ICD-10-CM ICD-9-CM   1. Atypical chest pain R07.89 786.59   2. PVC's (premature ventricular contractions) I49.3 427.69   3. SOB (shortness of breath) R06.02 786.05   4. Non-rheumatic mitral regurgitation I34.0 424.0   5. Elevated BP without diagnosis of hypertension R03.0 796.2     Problem List Items Addressed This Visit        Cardiac/Vascular    Non-rheumatic mitral regurgitation    Relevant Orders    Stress Echo Which stress agent will be used? Treadmill Exercise; Color Flow Doppler? Yes (Completed)     PVC's (premature ventricular contractions)    Relevant Orders    Stress Echo Which stress agent will be used? Treadmill Exercise; Color Flow Doppler? Yes (Completed)    Elevated BP without diagnosis of hypertension    Relevant Orders    Stress Echo Which stress agent will be used? Treadmill Exercise; Color Flow Doppler? Yes (Completed)       Other    SOB (shortness of breath)    Relevant Orders    Stress Echo Which stress agent will be used? Treadmill Exercise; Color Flow Doppler? Yes (Completed)    Atypical chest pain - Primary    Relevant Orders    Stress Echo Which stress agent will be used? Treadmill Exercise; Color Flow Doppler? Yes (Completed)           Plan:     LABS SOON, DAILY TOPROL, CHECK STRESS ECHO,ALL OTHER CV CLINICALLY STABLE, NO HF, NO TIA, NO CLINICAL ARRHYTHMIA,CONTINUE CURRENT MEDS, EDUCATION, DIET, EXERCISE, RETURN TO CLINIC IN FEW MONTHS, WATCH BLOOD PRESSURE      Atypical chest pain  -     Stress Echo Which stress agent will be used? Treadmill Exercise; Color Flow Doppler? Yes; Future    PVC's (premature ventricular contractions)  -     Stress Echo Which stress agent will be used? Treadmill Exercise; Color Flow Doppler? Yes; Future    SOB (shortness of breath)  -     Stress Echo Which stress agent will be used? Treadmill Exercise; Color Flow Doppler? Yes; Future    Non-rheumatic mitral regurgitation  -     Stress Echo Which stress agent will be used? Treadmill Exercise; Color Flow Doppler? Yes; Future    Elevated BP without diagnosis of hypertension  -     Stress Echo Which stress agent will be used? Treadmill Exercise; Color Flow Doppler? Yes; Future    Other orders  -     metoprolol succinate (TOPROL-XL) 25 MG 24 hr tablet; Take 0.5 tablets (12.5 mg total) by mouth once daily.  Dispense: 45 tablet; Refill: 1    RTC Low level/low impact aerobic exercise 5x's/wk. Heart healthy diet and risk factor modification.    See labs and med orders.    Aerobic exercise 5x's/wk. Heart healthy diet and  risk factor modification.    See labs and med orders.

## 2020-02-14 ENCOUNTER — CLINICAL SUPPORT (OUTPATIENT)
Dept: CARDIOLOGY | Facility: CLINIC | Age: 40
End: 2020-02-14
Attending: INTERNAL MEDICINE
Payer: COMMERCIAL

## 2020-02-14 VITALS — BODY MASS INDEX: 28.1 KG/M2 | HEIGHT: 75 IN | WEIGHT: 226 LBS

## 2020-02-14 DIAGNOSIS — R07.89 ATYPICAL CHEST PAIN: ICD-10-CM

## 2020-02-14 DIAGNOSIS — I34.0 NON-RHEUMATIC MITRAL REGURGITATION: ICD-10-CM

## 2020-02-14 DIAGNOSIS — I49.3 PVC'S (PREMATURE VENTRICULAR CONTRACTIONS): ICD-10-CM

## 2020-02-14 DIAGNOSIS — R03.0 ELEVATED BP WITHOUT DIAGNOSIS OF HYPERTENSION: ICD-10-CM

## 2020-02-14 DIAGNOSIS — R06.02 SOB (SHORTNESS OF BREATH): ICD-10-CM

## 2020-02-14 LAB
ASCENDING AORTA: 2.64 CM
AV INDEX (PROSTH): 0.83
AV MEAN GRADIENT: 3 MMHG
AV PEAK GRADIENT: 5 MMHG
AV VALVE AREA: 4.6 CM2
AV VELOCITY RATIO: 0.86
BSA FOR ECHO PROCEDURE: 2.33 M2
CV ECHO LV RWT: 0.29 CM
CV STRESS BASE HR: 100 BPM
DIASTOLIC BLOOD PRESSURE: 74 MMHG
DOP CALC AO PEAK VEL: 1.17 M/S
DOP CALC AO VTI: 26.45 CM
DOP CALC LVOT AREA: 5.6 CM2
DOP CALC LVOT DIAMETER: 2.66 CM
DOP CALC LVOT PEAK VEL: 1.01 M/S
DOP CALC LVOT STROKE VOLUME: 121.75 CM3
DOP CALCLVOT PEAK VEL VTI: 21.92 CM
E WAVE DECELERATION TIME: 147.92 MSEC
E/A RATIO: 1.21
E/E' RATIO: 3.88 M/S
ECHO LV POSTERIOR WALL: 0.84 CM (ref 0.6–1.1)
FRACTIONAL SHORTENING: 40 % (ref 28–44)
INTERVENTRICULAR SEPTUM: 0.92 CM (ref 0.6–1.1)
IVRT: 0.11 MSEC
LA MAJOR: 5.32 CM
LA MINOR: 5.1 CM
LA WIDTH: 3.96 CM
LEFT ATRIUM SIZE: 4 CM
LEFT ATRIUM VOLUME INDEX: 30.3 ML/M2
LEFT ATRIUM VOLUME: 70.12 CM3
LEFT INTERNAL DIMENSION IN SYSTOLE: 3.54 CM (ref 2.1–4)
LEFT VENTRICLE DIASTOLIC VOLUME INDEX: 74.68 ML/M2
LEFT VENTRICLE DIASTOLIC VOLUME: 172.61 ML
LEFT VENTRICLE MASS INDEX: 88 G/M2
LEFT VENTRICLE SYSTOLIC VOLUME INDEX: 22.7 ML/M2
LEFT VENTRICLE SYSTOLIC VOLUME: 52.46 ML
LEFT VENTRICULAR INTERNAL DIMENSION IN DIASTOLE: 5.89 CM (ref 3.5–6)
LEFT VENTRICULAR MASS: 203.08 G
LV LATERAL E/E' RATIO: 3.05 M/S
LV SEPTAL E/E' RATIO: 5.33 M/S
MV PEAK A VEL: 0.53 M/S
MV PEAK E VEL: 0.64 M/S
OHS CV CPX 1 MINUTE RECOVERY HEART RATE: 112 BPM
OHS CV CPX 85 PERCENT MAX PREDICTED HEART RATE MALE: 154
OHS CV CPX ESTIMATED METS: 15
OHS CV CPX MAX PREDICTED HEART RATE: 181
OHS CV CPX PATIENT IS FEMALE: 0
OHS CV CPX PATIENT IS MALE: 1
OHS CV CPX PEAK DIASTOLIC BLOOD PRESSURE: 82 MMHG
OHS CV CPX PEAK HEAR RATE: 160 BPM
OHS CV CPX PEAK RATE PRESSURE PRODUCT: NORMAL
OHS CV CPX PEAK SYSTOLIC BLOOD PRESSURE: 134 MMHG
OHS CV CPX PERCENT MAX PREDICTED HEART RATE ACHIEVED: 88
OHS CV CPX RATE PRESSURE PRODUCT PRESENTING: NORMAL
PISA TR MAX VEL: 2.82 M/S
PULM VEIN S/D RATIO: 1.41
PV PEAK D VEL: 0.41 M/S
PV PEAK S VEL: 0.58 M/S
RA MAJOR: 5.07 CM
RA PRESSURE: 3 MMHG
RA WIDTH: 4.6 CM
RIGHT VENTRICULAR END-DIASTOLIC DIMENSION: 3.83 CM
SINUS: 3.05 CM
STJ: 2.74 CM
STRESS ECHO POST EXERCISE DUR MIN: 7 MINUTES
STRESS ECHO POST EXERCISE DUR SEC: 55 SECONDS
SYSTOLIC BLOOD PRESSURE: 111 MMHG
TDI LATERAL: 0.21 M/S
TDI SEPTAL: 0.12 M/S
TDI: 0.17 M/S
TR MAX PG: 32 MMHG
TRICUSPID ANNULAR PLANE SYSTOLIC EXCURSION: 2.14 CM
TV REST PULMONARY ARTERY PRESSURE: 35 MMHG

## 2020-02-14 PROCEDURE — 99999 PR PBB SHADOW E&M-EST. PATIENT-LVL I: ICD-10-PCS | Mod: PBBFAC,,,

## 2020-02-14 PROCEDURE — 99999 PR PBB SHADOW E&M-EST. PATIENT-LVL I: CPT | Mod: PBBFAC,,,

## 2020-02-14 PROCEDURE — 93351 STRESS TTE COMPLETE: CPT | Mod: S$GLB,,, | Performed by: INTERNAL MEDICINE

## 2020-02-14 PROCEDURE — 93351 STRESS ECHO (CUPID ONLY): ICD-10-PCS | Mod: S$GLB,,, | Performed by: INTERNAL MEDICINE

## 2020-02-21 ENCOUNTER — TELEPHONE (OUTPATIENT)
Dept: CARDIOLOGY | Facility: CLINIC | Age: 40
End: 2020-02-21

## 2020-02-21 DIAGNOSIS — R94.39 ABNORMAL STRESS ECHO: Primary | ICD-10-CM

## 2020-03-02 ENCOUNTER — TELEPHONE (OUTPATIENT)
Dept: CARDIOLOGY | Facility: CLINIC | Age: 40
End: 2020-03-02

## 2020-03-02 NOTE — TELEPHONE ENCOUNTER
----- Message from Phylicia Churchill sent at 3/2/2020  8:22 AM CST -----  Contact: Pt  Pt called to cancel catheter tests for tomorrow. Please call pt back as soon as possible to be advised. Thank you      [phone]

## 2020-03-10 PROBLEM — R94.39 ABNORMAL STRESS ECHO: Status: ACTIVE | Noted: 2020-03-10

## 2020-03-25 ENCOUNTER — TELEPHONE (OUTPATIENT)
Dept: CARDIOLOGY | Facility: CLINIC | Age: 40
End: 2020-03-25

## 2020-04-27 ENCOUNTER — TELEPHONE (OUTPATIENT)
Dept: CARDIOLOGY | Facility: CLINIC | Age: 40
End: 2020-04-27

## 2020-04-27 NOTE — TELEPHONE ENCOUNTER
----- Message from Jill Champion sent at 4/27/2020 11:42 AM CDT -----  Contact: Jaren bae  Type: Needs Medical Advice  Who Called:  Jaren Harley Call Back Number: [phone]  Additional Information: Pls call pt back regarding a vm about rescheduling a heart cath

## 2020-08-12 ENCOUNTER — OFFICE VISIT (OUTPATIENT)
Dept: CARDIOLOGY | Facility: CLINIC | Age: 40
End: 2020-08-12
Payer: COMMERCIAL

## 2020-08-12 VITALS
HEART RATE: 71 BPM | DIASTOLIC BLOOD PRESSURE: 74 MMHG | OXYGEN SATURATION: 99 % | WEIGHT: 224.63 LBS | SYSTOLIC BLOOD PRESSURE: 118 MMHG | HEIGHT: 75 IN | BODY MASS INDEX: 27.93 KG/M2

## 2020-08-12 DIAGNOSIS — I49.3 PVC'S (PREMATURE VENTRICULAR CONTRACTIONS): ICD-10-CM

## 2020-08-12 DIAGNOSIS — R93.1 AGATSTON CAC SCORE, <100: ICD-10-CM

## 2020-08-12 DIAGNOSIS — Q24.5 CORONARY-MYOCARDIAL BRIDGE: Primary | ICD-10-CM

## 2020-08-12 DIAGNOSIS — I34.0 NON-RHEUMATIC MITRAL REGURGITATION: ICD-10-CM

## 2020-08-12 PROCEDURE — 3008F PR BODY MASS INDEX (BMI) DOCUMENTED: ICD-10-PCS | Mod: CPTII,S$GLB,, | Performed by: INTERNAL MEDICINE

## 2020-08-12 PROCEDURE — 99214 PR OFFICE/OUTPT VISIT, EST, LEVL IV, 30-39 MIN: ICD-10-PCS | Mod: S$GLB,,, | Performed by: INTERNAL MEDICINE

## 2020-08-12 PROCEDURE — 99214 OFFICE O/P EST MOD 30 MIN: CPT | Mod: S$GLB,,, | Performed by: INTERNAL MEDICINE

## 2020-08-12 PROCEDURE — 3008F BODY MASS INDEX DOCD: CPT | Mod: CPTII,S$GLB,, | Performed by: INTERNAL MEDICINE

## 2020-08-12 RX ORDER — DEXLANSOPRAZOLE 60 MG/1
60 CAPSULE, DELAYED RELEASE ORAL
COMMUNITY
Start: 2020-06-23

## 2020-08-12 RX ORDER — METOPROLOL SUCCINATE 25 MG/1
12.5 TABLET, EXTENDED RELEASE ORAL DAILY
Qty: 45 TABLET | Refills: 1 | Status: SHIPPED | OUTPATIENT
Start: 2020-08-12 | End: 2021-03-10 | Stop reason: SDUPTHER

## 2020-08-12 NOTE — PROGRESS NOTES
Subjective:    Patient ID:  Jaren Breen is a 40 y.o. male who presents for Chest Pain (LHC), Hypertension, and Hyperlipidemia        HPI  LAST CMP OK IN MARCH, DOING OK, CATH IN MARCH OK, EXCEPT FOR MILD BRIDGING, OVERALL DOING OKAY, SOME ARTHRITIC-TYPE SYMPTOMS, SEE ROS    Past Medical History:   Diagnosis Date    Anticoagulant long-term use     Arthritis     Heart murmur     Hypercholesterolemia 2018    Stomach ulcer     Valvular regurgitation      Past Surgical History:   Procedure Laterality Date    CARDIAC CATHETERIZATION      CHOLECYSTECTOMY  2017    CORONARY ANGIOGRAPHY N/A 3/10/2020    Procedure: ANGIOGRAM, CORONARY ARTERY;  Surgeon: Gianni Milner MD;  Location: [facility] CATH;  Service: Cardiology;  Laterality: N/A;    ESOPHAGOGASTRODUODENOSCOPY      LEFT HEART CATHETERIZATION Left 3/10/2020    Procedure: Left heart cath;  Surgeon: Gianni Milner MD;  Location: [facility] CATH;  Service: Cardiology;  Laterality: Left;     Family History   Problem Relation Age of Onset    Heart disease Mother     Hypertension Father      Social History     Socioeconomic History    Marital status:      Spouse name: Not on file    Number of children: Not on file    Years of education: Not on file    Highest education level: Not on file   Occupational History    Not on file   Social Needs    Financial resource strain: Not on file    Food insecurity     Worry: Not on file     Inability: Not on file    Transportation needs     Medical: Not on file     Non-medical: Not on file   Tobacco Use    Smoking status: Former Smoker     Start date:      Quit date:      Years since quittin.6    Smokeless tobacco: Never Used   Substance and Sexual Activity    Alcohol use: Yes     Comment: Occasional    Drug use: No    Sexual activity: Not on file   Lifestyle    Physical activity     Days per week: Not on file     Minutes per session: Not on file    Stress: Not on file   Relationships     Social connections     Talks on phone: Not on file     Gets together: Not on file     Attends Latter day service: Not on file     Active member of club or organization: Not on file     Attends meetings of clubs or organizations: Not on file     Relationship status: Not on file   Other Topics Concern    Not on file   Social History Narrative    Not on file       Review of patient's allergies indicates:  No Known Allergies    Current Outpatient Medications:     aspirin 81 MG Chew, 81 mg once daily. , Disp: , Rfl:     dexlansoprazole (DEXILANT) 60 mg capsule, Take 60 mg by mouth., Disp: , Rfl:     metoprolol succinate (TOPROL-XL) 25 MG 24 hr tablet, Take 0.5 tablets (12.5 mg total) by mouth once daily., Disp: 45 tablet, Rfl: 1    sucralfate (CARAFATE) 1 gram tablet, Take 1 g by mouth once daily. , Disp: , Rfl:     Review of Systems   Constitution: Negative for chills, diaphoresis, fever, malaise/fatigue (SOME) and night sweats. Weight loss: SOME.   HENT: Negative for congestion and nosebleeds.    Eyes: Negative for blurred vision and visual disturbance.   Cardiovascular: Negative for chest pain (RARE, CHEST WALL/ MUSCLE), claudication, cyanosis, dyspnea on exertion, irregular heartbeat (SOME), leg swelling, near-syncope, orthopnea, palpitations, paroxysmal nocturnal dyspnea and syncope.   Respiratory: Negative for cough, hemoptysis, shortness of breath and wheezing.    Endocrine: Negative for cold intolerance, heat intolerance and polyuria.   Hematologic/Lymphatic: Negative for bleeding problem. Does not bruise/bleed easily.   Skin: Negative for color change, itching and nail changes.   Musculoskeletal: Negative for back pain (CHRONIC), falls, joint pain (SHOULDERS) and myalgias. Neck pain: SOME.   Gastrointestinal: Negative for abdominal pain, change in bowel habit, dysphagia, heartburn (STABLE ON MEDS), hematemesis, jaundice, melena and vomiting.   Genitourinary: Negative for dysuria, flank pain and frequency  "(YES).   Neurological: Negative for brief paralysis, dizziness (OCC WITH EATING), focal weakness, light-headedness, loss of balance, tremors and weakness. Headaches: RECURRENT. Numbness: OCC.   Psychiatric/Behavioral: Negative for altered mental status, depression and memory loss.   Allergic/Immunologic: Negative for hives and persistent infections.        Objective:      Vitals:    08/12/20 0810   BP: 118/74   Pulse: 71   SpO2: 99%   Weight: 101.9 kg (224 lb 10.4 oz)   Height: 6' 3" (1.905 m)   PainSc:   4     Body mass index is 28.08 kg/m².    Physical Exam   Constitutional: He is oriented to person, place, and time. He appears well-developed and well-nourished. He is active.   HENT:   Head: Normocephalic and atraumatic.   Mouth/Throat: Oropharynx is clear and moist and mucous membranes are normal.   Eyes: Pupils are equal, round, and reactive to light. Conjunctivae and EOM are normal.   Neck: Normal range of motion. Neck supple. Normal carotid pulses, no hepatojugular reflux and no JVD present. Carotid bruit is not present. No edema and no erythema present. No thyromegaly present.   Cardiovascular: Normal rate and regular rhythm.  No extrasystoles are present. PMI is not displaced. Exam reveals no gallop, no distant heart sounds, no friction rub and no midsystolic click.   Murmur heard.  High-pitched holosystolic murmur is present at the apex.  Pulses:       Carotid pulses are 2+ on the right side and 2+ on the left side with bruit.       Radial pulses are 2+ on the right side and 2+ on the left side.        Femoral pulses are 2+ on the right side and 2+ on the left side.       Dorsalis pedis pulses are 2+ on the right side and 2+ on the left side.        Posterior tibial pulses are 2+ on the right side and 2+ on the left side.   Pulmonary/Chest: Effort normal and breath sounds normal. No accessory muscle usage. No tachypnea and no bradypnea. No respiratory distress.   Abdominal: Soft. Bowel sounds are normal. He " exhibits no distension and no mass. There is no hepatosplenomegaly. There is no CVA tenderness.   Musculoskeletal: Normal range of motion.         General: No deformity or edema.   Lymphadenopathy:     He has no cervical adenopathy.   Neurological: He is alert and oriented to person, place, and time. He has normal strength. He displays no tremor. No cranial nerve deficit.   Skin: Skin is warm and dry. No cyanosis or erythema. No pallor.   Psychiatric: He has a normal mood and affect. His speech is normal and behavior is normal.               ..    Chemistry        Component Value Date/Time     03/10/2020 0840    K 3.9 03/10/2020 0840     03/10/2020 0840    CO2 27 03/10/2020 0840    BUN 15 03/10/2020 0840    CREATININE 0.86 03/10/2020 0840    GLU 94 03/10/2020 0840        Component Value Date/Time    CALCIUM 9.0 03/10/2020 0840    ALKPHOS 50 03/10/2020 0840    AST 30 03/10/2020 0840    AST 31 05/11/2016 1820    ALT 31 03/10/2020 0840    BILITOT 0.6 03/10/2020 0840    ESTGFRAFRICA >60 03/10/2020 0840    EGFRNONAA >60 03/10/2020 0840            ..No results found for: CHOL  No results found for: HDL  No results found for: LDLCALC  No results found for: TRIG  No results found for: CHOLHDL  ..  Lab Results   Component Value Date    WBC 6.48 03/10/2020    HGB 13.6 (L) 03/10/2020    HCT 41.0 03/10/2020    MCV 91 03/10/2020     03/10/2020       Test(s) Reviewed  I have reviewed the following in detail:  [] Stress test   [] Angiography   [] Echocardiogram   [x] Labs   [] Other:       Assessment:         ICD-10-CM ICD-9-CM   1. Non-rheumatic mitral regurgitation  I34.0 424.0   2. PVC's (premature ventricular contractions)  I49.3 427.69   3. Coronary-myocardial bridge  Q24.5 746.85   4. Agatston CAC score, <100  R93.1 793.2     Problem List Items Addressed This Visit        Cardiac/Vascular    Non-rheumatic mitral regurgitation - Primary    PVC's (premature ventricular contractions)    Agatston CAC score,  <100    Coronary-myocardial bridge           Plan:     ALL CV CLINICALLY STABLE, NO ANGINA, NO HF, NO TIA, NO CLINICAL ARRHYTHMIA,CONTINUE CURRENT MEDS, EDUCATION, DIET, EXERCISE, COMPLIANCE WITH MEDICATION DAILY LOW-DOSE METOPROLOL, RETURN TO CLINIC IN 6 MONTHS      Non-rheumatic mitral regurgitation    PVC's (premature ventricular contractions)    Coronary-myocardial bridge    Agatston CAC score, <100    Other orders  -     metoprolol succinate (TOPROL-XL) 25 MG 24 hr tablet; Take 0.5 tablets (12.5 mg total) by mouth once daily.  Dispense: 45 tablet; Refill: 1    RTC Low level/low impact aerobic exercise 5x's/wk. Heart healthy diet and risk factor modification.    See labs and med orders.    Aerobic exercise 5x's/wk. Heart healthy diet and risk factor modification.    See labs and med orders.

## 2021-03-10 ENCOUNTER — OFFICE VISIT (OUTPATIENT)
Dept: CARDIOLOGY | Facility: CLINIC | Age: 41
End: 2021-03-10
Payer: COMMERCIAL

## 2021-03-10 VITALS
DIASTOLIC BLOOD PRESSURE: 66 MMHG | WEIGHT: 229.94 LBS | HEART RATE: 63 BPM | SYSTOLIC BLOOD PRESSURE: 110 MMHG | BODY MASS INDEX: 28.59 KG/M2 | HEIGHT: 75 IN

## 2021-03-10 DIAGNOSIS — R07.89 CHEST WALL PAIN: ICD-10-CM

## 2021-03-10 DIAGNOSIS — Z82.49 FAMILY HISTORY OF PREMATURE CAD: ICD-10-CM

## 2021-03-10 DIAGNOSIS — I49.3 PVC'S (PREMATURE VENTRICULAR CONTRACTIONS): ICD-10-CM

## 2021-03-10 DIAGNOSIS — E66.3 OVERWEIGHT (BMI 25.0-29.9): ICD-10-CM

## 2021-03-10 DIAGNOSIS — I34.0 NON-RHEUMATIC MITRAL REGURGITATION: ICD-10-CM

## 2021-03-10 DIAGNOSIS — Q24.5 CORONARY-MYOCARDIAL BRIDGE: Primary | ICD-10-CM

## 2021-03-10 PROCEDURE — 99214 PR OFFICE/OUTPT VISIT, EST, LEVL IV, 30-39 MIN: ICD-10-PCS | Mod: S$GLB,,, | Performed by: INTERNAL MEDICINE

## 2021-03-10 PROCEDURE — 1125F PR PAIN SEVERITY QUANTIFIED, PAIN PRESENT: ICD-10-PCS | Mod: S$GLB,,, | Performed by: INTERNAL MEDICINE

## 2021-03-10 PROCEDURE — 3008F PR BODY MASS INDEX (BMI) DOCUMENTED: ICD-10-PCS | Mod: CPTII,S$GLB,, | Performed by: INTERNAL MEDICINE

## 2021-03-10 PROCEDURE — 3008F BODY MASS INDEX DOCD: CPT | Mod: CPTII,S$GLB,, | Performed by: INTERNAL MEDICINE

## 2021-03-10 PROCEDURE — 1125F AMNT PAIN NOTED PAIN PRSNT: CPT | Mod: S$GLB,,, | Performed by: INTERNAL MEDICINE

## 2021-03-10 PROCEDURE — 99214 OFFICE O/P EST MOD 30 MIN: CPT | Mod: S$GLB,,, | Performed by: INTERNAL MEDICINE

## 2021-03-10 RX ORDER — METOPROLOL SUCCINATE 25 MG/1
12.5 TABLET, EXTENDED RELEASE ORAL DAILY
Qty: 45 TABLET | Refills: 1 | Status: SHIPPED | OUTPATIENT
Start: 2021-03-10 | End: 2021-09-13 | Stop reason: SDUPTHER

## 2021-03-11 PROBLEM — R07.89 CHEST WALL PAIN: Status: ACTIVE | Noted: 2021-03-11

## 2021-03-23 ENCOUNTER — TELEPHONE (OUTPATIENT)
Dept: CARDIOLOGY | Facility: CLINIC | Age: 41
End: 2021-03-23

## 2021-09-13 ENCOUNTER — LAB VISIT (OUTPATIENT)
Dept: LAB | Facility: HOSPITAL | Age: 41
End: 2021-09-13
Attending: INTERNAL MEDICINE
Payer: COMMERCIAL

## 2021-09-13 ENCOUNTER — OFFICE VISIT (OUTPATIENT)
Dept: CARDIOLOGY | Facility: CLINIC | Age: 41
End: 2021-09-13
Payer: COMMERCIAL

## 2021-09-13 VITALS
BODY MASS INDEX: 29.38 KG/M2 | SYSTOLIC BLOOD PRESSURE: 108 MMHG | WEIGHT: 236.31 LBS | HEART RATE: 62 BPM | RESPIRATION RATE: 16 BRPM | DIASTOLIC BLOOD PRESSURE: 69 MMHG | HEIGHT: 75 IN

## 2021-09-13 DIAGNOSIS — I34.0 NON-RHEUMATIC MITRAL REGURGITATION: Chronic | ICD-10-CM

## 2021-09-13 DIAGNOSIS — E78.00 HYPERCHOLESTEROLEMIA: Chronic | ICD-10-CM

## 2021-09-13 DIAGNOSIS — I49.3 PVC'S (PREMATURE VENTRICULAR CONTRACTIONS): Chronic | ICD-10-CM

## 2021-09-13 DIAGNOSIS — Q24.5 CORONARY-MYOCARDIAL BRIDGE: Primary | Chronic | ICD-10-CM

## 2021-09-13 DIAGNOSIS — Z13.220 LIPID SCREENING: ICD-10-CM

## 2021-09-13 DIAGNOSIS — E66.3 OVERWEIGHT (BMI 25.0-29.9): Chronic | ICD-10-CM

## 2021-09-13 LAB
CHOLEST SERPL-MCNC: 199 MG/DL (ref 120–199)
CHOLEST/HDLC SERPL: 4.5 {RATIO} (ref 2–5)
HDLC SERPL-MCNC: 44 MG/DL (ref 40–75)
HDLC SERPL: 22.1 % (ref 20–50)
LDLC SERPL CALC-MCNC: 124.6 MG/DL (ref 63–159)
NONHDLC SERPL-MCNC: 155 MG/DL
TRIGL SERPL-MCNC: 152 MG/DL (ref 30–150)

## 2021-09-13 PROCEDURE — 3078F DIAST BP <80 MM HG: CPT | Mod: CPTII,S$GLB,, | Performed by: INTERNAL MEDICINE

## 2021-09-13 PROCEDURE — 36415 COLL VENOUS BLD VENIPUNCTURE: CPT | Mod: PO | Performed by: INTERNAL MEDICINE

## 2021-09-13 PROCEDURE — 3078F PR MOST RECENT DIASTOLIC BLOOD PRESSURE < 80 MM HG: ICD-10-PCS | Mod: CPTII,S$GLB,, | Performed by: INTERNAL MEDICINE

## 2021-09-13 PROCEDURE — 99999 PR PBB SHADOW E&M-EST. PATIENT-LVL III: ICD-10-PCS | Mod: PBBFAC,,, | Performed by: INTERNAL MEDICINE

## 2021-09-13 PROCEDURE — 3074F SYST BP LT 130 MM HG: CPT | Mod: CPTII,S$GLB,, | Performed by: INTERNAL MEDICINE

## 2021-09-13 PROCEDURE — 3074F PR MOST RECENT SYSTOLIC BLOOD PRESSURE < 130 MM HG: ICD-10-PCS | Mod: CPTII,S$GLB,, | Performed by: INTERNAL MEDICINE

## 2021-09-13 PROCEDURE — 3008F BODY MASS INDEX DOCD: CPT | Mod: CPTII,S$GLB,, | Performed by: INTERNAL MEDICINE

## 2021-09-13 PROCEDURE — 99999 PR PBB SHADOW E&M-EST. PATIENT-LVL III: CPT | Mod: PBBFAC,,, | Performed by: INTERNAL MEDICINE

## 2021-09-13 PROCEDURE — 1159F PR MEDICATION LIST DOCUMENTED IN MEDICAL RECORD: ICD-10-PCS | Mod: CPTII,S$GLB,, | Performed by: INTERNAL MEDICINE

## 2021-09-13 PROCEDURE — 1159F MED LIST DOCD IN RCRD: CPT | Mod: CPTII,S$GLB,, | Performed by: INTERNAL MEDICINE

## 2021-09-13 PROCEDURE — 3008F PR BODY MASS INDEX (BMI) DOCUMENTED: ICD-10-PCS | Mod: CPTII,S$GLB,, | Performed by: INTERNAL MEDICINE

## 2021-09-13 PROCEDURE — 80061 LIPID PANEL: CPT | Performed by: INTERNAL MEDICINE

## 2021-09-13 PROCEDURE — 99214 PR OFFICE/OUTPT VISIT, EST, LEVL IV, 30-39 MIN: ICD-10-PCS | Mod: S$GLB,,, | Performed by: INTERNAL MEDICINE

## 2021-09-13 PROCEDURE — 99214 OFFICE O/P EST MOD 30 MIN: CPT | Mod: S$GLB,,, | Performed by: INTERNAL MEDICINE

## 2021-09-13 RX ORDER — METOPROLOL SUCCINATE 25 MG/1
12.5 TABLET, EXTENDED RELEASE ORAL DAILY
Qty: 45 TABLET | Refills: 1 | Status: SHIPPED | OUTPATIENT
Start: 2021-09-13 | End: 2022-12-19 | Stop reason: SDUPTHER

## 2021-12-13 PROBLEM — Z13.220 LIPID SCREENING: Status: RESOLVED | Noted: 2017-12-07 | Resolved: 2021-12-13

## 2022-11-08 DIAGNOSIS — Z13.220 LIPID SCREENING: Primary | ICD-10-CM

## 2022-11-08 DIAGNOSIS — I34.0 NON-RHEUMATIC MITRAL REGURGITATION: ICD-10-CM

## 2022-11-17 ENCOUNTER — LAB VISIT (OUTPATIENT)
Dept: LAB | Facility: CLINIC | Age: 42
End: 2022-11-17
Payer: COMMERCIAL

## 2022-11-17 DIAGNOSIS — Z13.220 LIPID SCREENING: ICD-10-CM

## 2022-11-17 DIAGNOSIS — I34.0 NON-RHEUMATIC MITRAL REGURGITATION: ICD-10-CM

## 2022-11-17 LAB
ALBUMIN SERPL BCP-MCNC: 4.4 G/DL (ref 3.5–5.2)
ALP SERPL-CCNC: 58 U/L (ref 55–135)
ALT SERPL W/O P-5'-P-CCNC: 27 U/L (ref 10–44)
ANION GAP SERPL CALC-SCNC: 5 MMOL/L (ref 8–16)
AST SERPL-CCNC: 27 U/L (ref 10–40)
BILIRUB SERPL-MCNC: 0.6 MG/DL (ref 0.1–1)
BUN SERPL-MCNC: 14 MG/DL (ref 6–20)
CALCIUM SERPL-MCNC: 9.5 MG/DL (ref 8.7–10.5)
CHLORIDE SERPL-SCNC: 107 MMOL/L (ref 95–110)
CHOLEST SERPL-MCNC: 199 MG/DL (ref 120–199)
CHOLEST/HDLC SERPL: 3.4 {RATIO} (ref 2–5)
CO2 SERPL-SCNC: 27 MMOL/L (ref 23–29)
CREAT SERPL-MCNC: 0.9 MG/DL (ref 0.5–1.4)
EST. GFR  (NO RACE VARIABLE): >60 ML/MIN/1.73 M^2
GLUCOSE SERPL-MCNC: 92 MG/DL (ref 70–110)
HDLC SERPL-MCNC: 58 MG/DL (ref 40–75)
HDLC SERPL: 29.1 % (ref 20–50)
LDLC SERPL CALC-MCNC: 130 MG/DL (ref 63–159)
NONHDLC SERPL-MCNC: 141 MG/DL
POTASSIUM SERPL-SCNC: 4.7 MMOL/L (ref 3.5–5.1)
PROT SERPL-MCNC: 7.5 G/DL (ref 6–8.4)
SODIUM SERPL-SCNC: 139 MMOL/L (ref 136–145)
TRIGL SERPL-MCNC: 55 MG/DL (ref 30–150)

## 2022-11-17 PROCEDURE — 80061 LIPID PANEL: CPT | Performed by: INTERNAL MEDICINE

## 2022-11-17 PROCEDURE — 80053 COMPREHEN METABOLIC PANEL: CPT | Performed by: INTERNAL MEDICINE

## 2022-11-17 PROCEDURE — 36415 PR COLLECTION VENOUS BLOOD,VENIPUNCTURE: ICD-10-PCS | Mod: ,,, | Performed by: STUDENT IN AN ORGANIZED HEALTH CARE EDUCATION/TRAINING PROGRAM

## 2022-11-17 PROCEDURE — 36415 COLL VENOUS BLD VENIPUNCTURE: CPT | Mod: ,,, | Performed by: STUDENT IN AN ORGANIZED HEALTH CARE EDUCATION/TRAINING PROGRAM

## 2022-12-19 ENCOUNTER — OFFICE VISIT (OUTPATIENT)
Dept: CARDIOLOGY | Facility: CLINIC | Age: 42
End: 2022-12-19
Payer: COMMERCIAL

## 2022-12-19 VITALS
HEART RATE: 54 BPM | HEIGHT: 75 IN | SYSTOLIC BLOOD PRESSURE: 111 MMHG | BODY MASS INDEX: 28.57 KG/M2 | WEIGHT: 229.75 LBS | DIASTOLIC BLOOD PRESSURE: 63 MMHG

## 2022-12-19 DIAGNOSIS — E78.00 HYPERCHOLESTEROLEMIA: Chronic | ICD-10-CM

## 2022-12-19 DIAGNOSIS — E66.3 OVERWEIGHT (BMI 25.0-29.9): Chronic | ICD-10-CM

## 2022-12-19 DIAGNOSIS — I49.3 PVC'S (PREMATURE VENTRICULAR CONTRACTIONS): ICD-10-CM

## 2022-12-19 DIAGNOSIS — Q24.5 CORONARY-MYOCARDIAL BRIDGE: Primary | Chronic | ICD-10-CM

## 2022-12-19 DIAGNOSIS — I34.0 NON-RHEUMATIC MITRAL REGURGITATION: Chronic | ICD-10-CM

## 2022-12-19 PROCEDURE — 3078F PR MOST RECENT DIASTOLIC BLOOD PRESSURE < 80 MM HG: ICD-10-PCS | Mod: CPTII,S$GLB,, | Performed by: INTERNAL MEDICINE

## 2022-12-19 PROCEDURE — 3008F PR BODY MASS INDEX (BMI) DOCUMENTED: ICD-10-PCS | Mod: CPTII,S$GLB,, | Performed by: INTERNAL MEDICINE

## 2022-12-19 PROCEDURE — 99214 OFFICE O/P EST MOD 30 MIN: CPT | Mod: S$GLB,,, | Performed by: INTERNAL MEDICINE

## 2022-12-19 PROCEDURE — 99999 PR PBB SHADOW E&M-EST. PATIENT-LVL III: CPT | Mod: PBBFAC,,, | Performed by: INTERNAL MEDICINE

## 2022-12-19 PROCEDURE — 99999 PR PBB SHADOW E&M-EST. PATIENT-LVL III: ICD-10-PCS | Mod: PBBFAC,,, | Performed by: INTERNAL MEDICINE

## 2022-12-19 PROCEDURE — 3078F DIAST BP <80 MM HG: CPT | Mod: CPTII,S$GLB,, | Performed by: INTERNAL MEDICINE

## 2022-12-19 PROCEDURE — 3008F BODY MASS INDEX DOCD: CPT | Mod: CPTII,S$GLB,, | Performed by: INTERNAL MEDICINE

## 2022-12-19 PROCEDURE — 3074F PR MOST RECENT SYSTOLIC BLOOD PRESSURE < 130 MM HG: ICD-10-PCS | Mod: CPTII,S$GLB,, | Performed by: INTERNAL MEDICINE

## 2022-12-19 PROCEDURE — 99214 PR OFFICE/OUTPT VISIT, EST, LEVL IV, 30-39 MIN: ICD-10-PCS | Mod: S$GLB,,, | Performed by: INTERNAL MEDICINE

## 2022-12-19 PROCEDURE — 3074F SYST BP LT 130 MM HG: CPT | Mod: CPTII,S$GLB,, | Performed by: INTERNAL MEDICINE

## 2022-12-19 PROCEDURE — 1159F MED LIST DOCD IN RCRD: CPT | Mod: CPTII,S$GLB,, | Performed by: INTERNAL MEDICINE

## 2022-12-19 PROCEDURE — 1159F PR MEDICATION LIST DOCUMENTED IN MEDICAL RECORD: ICD-10-PCS | Mod: CPTII,S$GLB,, | Performed by: INTERNAL MEDICINE

## 2022-12-19 RX ORDER — PRAVASTATIN SODIUM 10 MG/1
10 TABLET ORAL DAILY
Qty: 90 TABLET | Refills: 1 | Status: SHIPPED | OUTPATIENT
Start: 2022-12-19 | End: 2023-07-26 | Stop reason: SDUPTHER

## 2022-12-19 RX ORDER — METOPROLOL SUCCINATE 25 MG/1
12.5 TABLET, EXTENDED RELEASE ORAL DAILY
Qty: 45 TABLET | Refills: 1 | Status: SHIPPED | OUTPATIENT
Start: 2022-12-19 | End: 2024-01-03 | Stop reason: DRUGHIGH

## 2022-12-19 NOTE — PROGRESS NOTES
Subjective:    Patient ID:  Jaren Breen is a 42 y.o. male who presents for Risk Factor Management For Atherosclerosis, Hyperlipidemia, and Valvular Heart Disease        HPI    DISCUSSED LABS AND GOALS CMP NORMAL  UP, HDL 58 UP, TRIGLYCERIDE 55 DOWN, DOING WELL,MILD GARCIA,  SEE ROS  Past Medical History:   Diagnosis Date    Anticoagulant long-term use     Arthritis     Heart murmur     Hypercholesterolemia 2018    Stomach ulcer     Valvular regurgitation      Past Surgical History:   Procedure Laterality Date    CARDIAC CATHETERIZATION      CHOLECYSTECTOMY  2017    CORONARY ANGIOGRAPHY N/A 3/10/2020    Procedure: ANGIOGRAM, CORONARY ARTERY;  Surgeon: Gianni Milner MD;  Location: [facility] CATH;  Service: Cardiology;  Laterality: N/A;    ESOPHAGOGASTRODUODENOSCOPY      LEFT HEART CATHETERIZATION Left 3/10/2020    Procedure: Left heart cath;  Surgeon: Gianni Milner MD;  Location: [facility] CATH;  Service: Cardiology;  Laterality: Left;     Family History   Problem Relation Age of Onset    Heart disease Mother     Hypertension Father      Social History     Socioeconomic History    Marital status:    Tobacco Use    Smoking status: Former     Types: Cigarettes     Start date:      Quit date:      Years since quittin.9    Smokeless tobacco: Never   Substance and Sexual Activity    Alcohol use: Yes     Comment: Occasional    Drug use: No       Review of patient's allergies indicates:  No Known Allergies    Current Outpatient Medications:     aspirin 81 MG Chew, 81 mg once daily. , Disp: , Rfl:     dexlansoprazole (DEXILANT) 60 mg capsule, Take 60 mg by mouth., Disp: , Rfl:     sucralfate (CARAFATE) 1 gram tablet, Take 1 g by mouth once daily. , Disp: , Rfl:     metoprolol succinate (TOPROL-XL) 25 MG 24 hr tablet, Take 0.5 tablets (12.5 mg total) by mouth once daily., Disp: 45 tablet, Rfl: 1    pravastatin (PRAVACHOL) 10 MG tablet, Take 1 tablet (10 mg total) by mouth once daily., Disp: 90  "tablet, Rfl: 1    Review of Systems   Constitutional: Negative for chills, diaphoresis, fever, malaise/fatigue and night sweats.   HENT:  Negative for congestion and nosebleeds.    Eyes:  Negative for blurred vision and visual disturbance.   Cardiovascular:  Negative for chest pain, claudication, cyanosis, dyspnea on exertion (MILD STAIRS), irregular heartbeat (RARE), leg swelling, near-syncope, orthopnea, palpitations, paroxysmal nocturnal dyspnea and syncope.   Respiratory:  Negative for cough, hemoptysis, shortness of breath and wheezing.    Endocrine: Negative for polyphagia and polyuria.   Hematologic/Lymphatic: Negative for adenopathy. Does not bruise/bleed easily.   Skin:  Negative for color change and rash.   Musculoskeletal:  Positive for joint pain (SHOULDER). Negative for back pain (CHRONIC), falls and myalgias.   Gastrointestinal:  Positive for heartburn (STABLE). Negative for abdominal pain, change in bowel habit, dysphagia, jaundice, melena and nausea.   Genitourinary:  Negative for dysuria and flank pain.   Neurological:  Negative for brief paralysis, focal weakness, headaches, light-headedness and weakness.   Psychiatric/Behavioral:  Negative for altered mental status and depression.       Objective:      Vitals:    12/19/22 1306   BP: 111/63   Pulse: (!) 54   Weight: 104.2 kg (229 lb 11.5 oz)   Height: 6' 3" (1.905 m)   PainSc: 0-No pain     Body mass index is 28.71 kg/m².    Physical Exam  Constitutional:       Appearance: He is well-developed and overweight.   HENT:      Head: Normocephalic and atraumatic.   Eyes:      General: No scleral icterus.     Extraocular Movements: Extraocular movements intact.   Neck:      Vascular: Normal carotid pulses. No carotid bruit, hepatojugular reflux or JVD.   Cardiovascular:      Rate and Rhythm: Normal rate and regular rhythm. No extrasystoles are present.     Pulses:           Carotid pulses are 2+ on the right side and 2+ on the left side.       Radial " pulses are 2+ on the right side and 2+ on the left side.        Femoral pulses are 2+ on the right side and 2+ on the left side.       Posterior tibial pulses are 2+ on the right side and 2+ on the left side.      Heart sounds: Murmur heard.   Systolic murmur is present with a grade of 1/6 at the lower left sternal border and apex.     No friction rub. No gallop.   Pulmonary:      Effort: Pulmonary effort is normal.      Breath sounds: Normal breath sounds. No rales.   Chest:      Chest wall: No tenderness.   Abdominal:      Palpations: Abdomen is soft. There is no hepatomegaly.      Tenderness: There is no abdominal tenderness.   Musculoskeletal:         General: Normal range of motion.      Cervical back: Neck supple.      Right lower leg: No edema.      Left lower leg: No edema.      Comments: NO ANKLE EDEMA   Skin:     General: Skin is warm and dry.      Capillary Refill: Capillary refill takes less than 2 seconds.   Neurological:      General: No focal deficit present.      Mental Status: He is alert and oriented to person, place, and time.      Cranial Nerves: No cranial nerve deficit.   Psychiatric:         Mood and Affect: Mood normal.         Speech: Speech normal.         Behavior: Behavior normal.             ..    Chemistry        Component Value Date/Time     11/17/2022 0846    K 4.7 11/17/2022 0846     11/17/2022 0846    CO2 27 11/17/2022 0846    BUN 14 11/17/2022 0846    CREATININE 0.9 11/17/2022 0846    GLU 92 11/17/2022 0846        Component Value Date/Time    CALCIUM 9.5 11/17/2022 0846    ALKPHOS 58 11/17/2022 0846    AST 27 11/17/2022 0846    AST 31 05/11/2016 1820    ALT 27 11/17/2022 0846    BILITOT 0.6 11/17/2022 0846    ESTGFRAFRICA >60 03/10/2020 0840    EGFRNONAA >60 03/10/2020 0840            ..  Lab Results   Component Value Date    CHOL 199 11/17/2022    CHOL 199 09/13/2021     Lab Results   Component Value Date    HDL 58 11/17/2022    HDL 44 09/13/2021     Lab Results    Component Value Date    LDLCALC 130.0 11/17/2022    LDLCALC 124.6 09/13/2021     Lab Results   Component Value Date    TRIG 55 11/17/2022    TRIG 152 (H) 09/13/2021     Lab Results   Component Value Date    CHOLHDL 29.1 11/17/2022    CHOLHDL 22.1 09/13/2021     ..  Lab Results   Component Value Date    WBC 6.48 03/10/2020    HGB 13.6 (L) 03/10/2020    HCT 41.0 03/10/2020    MCV 91 03/10/2020     03/10/2020       Test(s) Reviewed  I have reviewed the following in detail:  [] Stress test   [] Angiography   [] Echocardiogram   [x] Labs   [] Other:       Assessment:         ICD-10-CM ICD-9-CM   1. Coronary-myocardial bridge  Q24.5 746.85   2. Non-rheumatic mitral regurgitation  I34.0 424.0   3. Hypercholesterolemia  E78.00 272.0   4. PVC's (premature ventricular contractions)  I49.3 427.69   5. Overweight (BMI 25.0-29.9)  E66.3 278.02     Problem List Items Addressed This Visit          Cardiac/Vascular    Non-rheumatic mitral regurgitation    PVC's (premature ventricular contractions)    Hypercholesterolemia    Relevant Orders    Comprehensive Metabolic Panel    Lipid Panel    Coronary-myocardial bridge - Primary    Relevant Orders    Comprehensive Metabolic Panel       Endocrine    Overweight (BMI 25.0-29.9)        Plan:         ADD LOW-DOSE STATIN FOR DYSLIPIDEMIA WHICH HAS WORSENED DESPITE DIET,ALL CV CLINICALLY STABLE, NO ANGINA, NO HF, NO TIA, NO CIGARETTE CLINICAL ARRHYTHMIA,CONTINUE CURRENT MEDS, EDUCATION, DIET, EXERCISE , WEIGHT LOSS RETURN TO CLINIC IN 6 MONTHS WITH LABS  Coronary-myocardial bridge  -     Comprehensive Metabolic Panel; Future; Expected date: 06/19/2023    Non-rheumatic mitral regurgitation    Hypercholesterolemia  Comments:  DIET AND MEDS  Orders:  -     Comprehensive Metabolic Panel; Future; Expected date: 06/19/2023  -     Lipid Panel; Future; Expected date: 06/19/2023    PVC's (premature ventricular contractions)    Overweight (BMI 25.0-29.9)    Other orders  -     pravastatin  (PRAVACHOL) 10 MG tablet; Take 1 tablet (10 mg total) by mouth once daily.  Dispense: 90 tablet; Refill: 1  -     metoprolol succinate (TOPROL-XL) 25 MG 24 hr tablet; Take 0.5 tablets (12.5 mg total) by mouth once daily.  Dispense: 45 tablet; Refill: 1    RTC Low level/low impact aerobic exercise 5x's/wk. Heart healthy diet and risk factor modification.    See labs and med orders.    Aerobic exercise 5x's/wk. Heart healthy diet and risk factor modification.    See labs and med orders.

## 2023-06-14 ENCOUNTER — LAB VISIT (OUTPATIENT)
Dept: LAB | Facility: HOSPITAL | Age: 43
End: 2023-06-14
Attending: INTERNAL MEDICINE
Payer: COMMERCIAL

## 2023-06-14 DIAGNOSIS — E78.00 HYPERCHOLESTEROLEMIA: Chronic | ICD-10-CM

## 2023-06-14 DIAGNOSIS — Q24.5 CORONARY-MYOCARDIAL BRIDGE: Chronic | ICD-10-CM

## 2023-06-14 LAB
ALBUMIN SERPL BCP-MCNC: 4.2 G/DL (ref 3.5–5.2)
ALP SERPL-CCNC: 50 U/L (ref 55–135)
ALT SERPL W/O P-5'-P-CCNC: 20 U/L (ref 10–44)
ANION GAP SERPL CALC-SCNC: 7 MMOL/L (ref 8–16)
AST SERPL-CCNC: 20 U/L (ref 10–40)
BILIRUB SERPL-MCNC: 0.6 MG/DL (ref 0.1–1)
BUN SERPL-MCNC: 17 MG/DL (ref 6–20)
CALCIUM SERPL-MCNC: 9.6 MG/DL (ref 8.7–10.5)
CHLORIDE SERPL-SCNC: 105 MMOL/L (ref 95–110)
CHOLEST SERPL-MCNC: 198 MG/DL (ref 120–199)
CHOLEST/HDLC SERPL: 3.9 {RATIO} (ref 2–5)
CO2 SERPL-SCNC: 28 MMOL/L (ref 23–29)
CREAT SERPL-MCNC: 1 MG/DL (ref 0.5–1.4)
EST. GFR  (NO RACE VARIABLE): >60 ML/MIN/1.73 M^2
GLUCOSE SERPL-MCNC: 95 MG/DL (ref 70–110)
HDLC SERPL-MCNC: 51 MG/DL (ref 40–75)
HDLC SERPL: 25.8 % (ref 20–50)
LDLC SERPL CALC-MCNC: 135.4 MG/DL (ref 63–159)
NONHDLC SERPL-MCNC: 147 MG/DL
POTASSIUM SERPL-SCNC: 4.5 MMOL/L (ref 3.5–5.1)
PROT SERPL-MCNC: 7.2 G/DL (ref 6–8.4)
SODIUM SERPL-SCNC: 140 MMOL/L (ref 136–145)
TRIGL SERPL-MCNC: 58 MG/DL (ref 30–150)

## 2023-06-14 PROCEDURE — 80061 LIPID PANEL: CPT | Performed by: INTERNAL MEDICINE

## 2023-06-14 PROCEDURE — 36415 COLL VENOUS BLD VENIPUNCTURE: CPT | Mod: PO | Performed by: INTERNAL MEDICINE

## 2023-06-14 PROCEDURE — 80053 COMPREHEN METABOLIC PANEL: CPT | Performed by: INTERNAL MEDICINE

## 2023-07-25 NOTE — PROGRESS NOTES
Subjective:    Patient ID:  Jaren Breen is a 43 y.o. male who presents for Coronary-myocardial bridge and Non-rheumatic mitral regurgitation        HPI  MISSED APPOINTMENTS, DISCUSSED LABS AND GOALS CMP OK  UP, HDL 51, TRIGLYCERIDE 58, WAS NOT TAKING PRAVACHOL,INTERMITTENT SWEATING, NASUEA, CP AT TIMES, WITH STRESS,  SEE ROS    Past Medical History:   Diagnosis Date    Anticoagulant long-term use     Arthritis     Heart murmur     Hypercholesterolemia 9/19/2018    Stomach ulcer     Valvular regurgitation      Past Surgical History:   Procedure Laterality Date    CARDIAC CATHETERIZATION      CHOLECYSTECTOMY  2017    CORONARY ANGIOGRAPHY N/A 3/10/2020    Procedure: ANGIOGRAM, CORONARY ARTERY;  Surgeon: Gianni Milner MD;  Location: [facility] CATH;  Service: Cardiology;  Laterality: N/A;    ESOPHAGOGASTRODUODENOSCOPY      LEFT HEART CATHETERIZATION Left 3/10/2020    Procedure: Left heart cath;  Surgeon: Gianni Milner MD;  Location: [facility] CATH;  Service: Cardiology;  Laterality: Left;     Family History   Problem Relation Age of Onset    Heart disease Mother     Hypertension Father      Social History     Socioeconomic History    Marital status:    Tobacco Use    Smoking status: Former     Types: Cigarettes     Start date: 2004     Quit date: 2013     Years since quitting: 10.5    Smokeless tobacco: Never   Substance and Sexual Activity    Alcohol use: Yes     Comment: Occasional    Drug use: No       Review of patient's allergies indicates:  No Known Allergies    Current Outpatient Medications:     aspirin 81 MG Chew, 81 mg once daily. , Disp: , Rfl:     dexlansoprazole (DEXILANT) 60 mg capsule, Take 60 mg by mouth., Disp: , Rfl:     metoprolol succinate (TOPROL-XL) 25 MG 24 hr tablet, Take 0.5 tablets (12.5 mg total) by mouth once daily., Disp: 45 tablet, Rfl: 1    sucralfate (CARAFATE) 1 gram tablet, Take 1 g by mouth once daily. , Disp: , Rfl:     pravastatin (PRAVACHOL) 10 MG tablet, Take 1 tablet  "(10 mg total) by mouth once daily., Disp: 90 tablet, Rfl: 1    Review of Systems   Constitutional: Negative for chills, diaphoresis, fever, malaise/fatigue, night sweats and weight loss.   HENT:  Negative for congestion and nosebleeds.    Eyes:  Negative for blurred vision and visual disturbance.   Cardiovascular:  Positive for dyspnea on exertion (MILD STAIRS). Negative for chest pain, claudication, cyanosis, irregular heartbeat, leg swelling, near-syncope, orthopnea, palpitations, paroxysmal nocturnal dyspnea and syncope.   Respiratory:  Negative for cough, hemoptysis, shortness of breath and wheezing.    Endocrine: Negative for polyphagia and polyuria.        SWEATING   Hematologic/Lymphatic: Negative for adenopathy. Does not bruise/bleed easily.   Skin:  Negative for color change and rash.   Musculoskeletal:  Positive for joint pain (SHOULDER). Negative for back pain (CHRONIC), falls and myalgias.   Gastrointestinal:  Positive for heartburn (STABLE). Negative for abdominal pain, change in bowel habit, dysphagia, jaundice, melena and nausea.   Genitourinary:  Negative for dysuria and flank pain.   Neurological:  Negative for brief paralysis, focal weakness, headaches, light-headedness and weakness.   Psychiatric/Behavioral:  Negative for altered mental status and depression.       Objective:      Vitals:    07/26/23 1021   BP: 112/61   Pulse: 61   Weight: 101.1 kg (222 lb 14.2 oz)   Height: 6' 3" (1.905 m)   PainSc: 0-No pain     Body mass index is 27.86 kg/m².    Physical Exam  Constitutional:       Appearance: He is well-developed and overweight.   HENT:      Head: Normocephalic and atraumatic.   Eyes:      General: No scleral icterus.     Extraocular Movements: Extraocular movements intact.      Conjunctiva/sclera: Conjunctivae normal.      Pupils: Pupils are equal, round, and reactive to light.   Neck:      Vascular: Normal carotid pulses. No carotid bruit or JVD.   Cardiovascular:      Rate and Rhythm: " Normal rate and regular rhythm. No extrasystoles are present.     Pulses:           Carotid pulses are 2+ on the right side and 2+ on the left side.       Radial pulses are 2+ on the right side and 2+ on the left side.        Femoral pulses are 2+ on the right side and 2+ on the left side.       Posterior tibial pulses are 2+ on the right side and 2+ on the left side.      Heart sounds: Murmur heard.   Systolic murmur is present with a grade of 1/6 at the lower left sternal border.     No friction rub. No gallop.   Pulmonary:      Effort: Pulmonary effort is normal.      Breath sounds: Normal breath sounds and air entry. No rales.   Chest:      Chest wall: No tenderness.   Abdominal:      Palpations: Abdomen is soft. There is no hepatomegaly.      Tenderness: There is no abdominal tenderness.   Musculoskeletal:         General: Normal range of motion.      Cervical back: Neck supple.      Right lower leg: No edema.      Left lower leg: No edema.      Comments: NO ANKLE EDEMA   Skin:     General: Skin is warm and dry.      Capillary Refill: Capillary refill takes less than 2 seconds.   Neurological:      General: No focal deficit present.      Mental Status: He is alert and oriented to person, place, and time.      Cranial Nerves: No cranial nerve deficit.   Psychiatric:         Mood and Affect: Mood normal.         Speech: Speech normal.         Behavior: Behavior normal.               ..    Chemistry        Component Value Date/Time     06/14/2023 0920    K 4.5 06/14/2023 0920     06/14/2023 0920    CO2 28 06/14/2023 0920    BUN 17 06/14/2023 0920    CREATININE 1.0 06/14/2023 0920    GLU 95 06/14/2023 0920        Component Value Date/Time    CALCIUM 9.6 06/14/2023 0920    ALKPHOS 50 (L) 06/14/2023 0920    AST 20 06/14/2023 0920    AST 31 05/11/2016 1820    ALT 20 06/14/2023 0920    BILITOT 0.6 06/14/2023 0920    ESTGFRAFRICA 109 05/23/2023 1037    ESTGFRAFRICA >60 03/10/2020 0840    EGFRNONAA >60  03/10/2020 0840            ..  Lab Results   Component Value Date    CHOL 198 06/14/2023    CHOL 199 11/17/2022    CHOL 204 (H) 03/24/2022     Lab Results   Component Value Date    HDL 51 06/14/2023    HDL 58 11/17/2022    HDL 46 03/24/2022     Lab Results   Component Value Date    LDLCALC 135.4 06/14/2023    LDLCALC 130.0 11/17/2022    LDLCALC 146 (H) 03/24/2022     Lab Results   Component Value Date    TRIG 58 06/14/2023    TRIG 55 11/17/2022    TRIG 67 03/24/2022     Lab Results   Component Value Date    CHOLHDL 25.8 06/14/2023    CHOLHDL 29.1 11/17/2022    CHOLHDL 22.1 09/13/2021     ..  Lab Results   Component Value Date    WBC 7.5 06/15/2023    HGB 13.5 06/15/2023    HCT 41.1 06/15/2023    MCV 91 03/10/2020     06/15/2023       Test(s) Reviewed  I have reviewed the following in detail:  [] Stress test   [] Angiography   [] Echocardiogram   [x] Labs   [] Other:       Assessment:         ICD-10-CM ICD-9-CM   1. Precordial pain  R07.2 786.51   2. Hypercholesterolemia  E78.00 272.0   3. Sweating abnormality  L74.9 705.89   4. Non-rheumatic mitral regurgitation  I34.0 424.0   5. H/O angina pectoris  Z86.79 V12.59   6. Agatston CAC score, <100  R93.1 793.2   7. Coronary-myocardial bridge  Q24.5 746.85     Problem List Items Addressed This Visit          Derm    Sweating abnormality    Relevant Orders    TSH       Cardiac/Vascular    Non-rheumatic mitral regurgitation    Relevant Orders    Exercise Stress - EKG    H/O angina pectoris    Hypercholesterolemia    Precordial pain - Primary    Relevant Orders    Exercise Stress - EKG    Agatston CAC score, <100    Coronary-myocardial bridge        Plan:     RE-START PRAVACHOL, CHECK TSH, STRESS EKG, NO TRUE ANGINA NO ARRHYTHMIA NO HEART FAILURE DIET EXERCISE SOME REASSURANCE PATIENT IS VERY ANXIOUS HAS STRESS AT WORK, RETURN TO CLINIC IN 3-4 MO      Precordial pain  -     Exercise Stress - EKG; Future    Hypercholesterolemia    Sweating abnormality  -     TSH;  Future; Expected date: 07/26/2023    Non-rheumatic mitral regurgitation  -     Exercise Stress - EKG; Future    H/O angina pectoris    Agatston CAC score, <100    Coronary-myocardial bridge    Other orders  -     pravastatin (PRAVACHOL) 10 MG tablet; Take 1 tablet (10 mg total) by mouth once daily.  Dispense: 90 tablet; Refill: 1    RTC Low level/low impact aerobic exercise 5x's/wk. Heart healthy diet and risk factor modification.    See labs and med orders.    Aerobic exercise 5x's/wk. Heart healthy diet and risk factor modification.    See labs and med orders.

## 2023-07-26 ENCOUNTER — OFFICE VISIT (OUTPATIENT)
Dept: CARDIOLOGY | Facility: CLINIC | Age: 43
End: 2023-07-26
Payer: COMMERCIAL

## 2023-07-26 VITALS
SYSTOLIC BLOOD PRESSURE: 112 MMHG | HEART RATE: 61 BPM | HEIGHT: 75 IN | DIASTOLIC BLOOD PRESSURE: 61 MMHG | BODY MASS INDEX: 27.71 KG/M2 | WEIGHT: 222.88 LBS

## 2023-07-26 DIAGNOSIS — Q24.5 CORONARY-MYOCARDIAL BRIDGE: Chronic | ICD-10-CM

## 2023-07-26 DIAGNOSIS — Z86.79 H/O ANGINA PECTORIS: Chronic | ICD-10-CM

## 2023-07-26 DIAGNOSIS — I34.0 NON-RHEUMATIC MITRAL REGURGITATION: Chronic | ICD-10-CM

## 2023-07-26 DIAGNOSIS — L74.9 SWEATING ABNORMALITY: ICD-10-CM

## 2023-07-26 DIAGNOSIS — E78.00 HYPERCHOLESTEROLEMIA: Chronic | ICD-10-CM

## 2023-07-26 DIAGNOSIS — R93.1 AGATSTON CAC SCORE, <100: Chronic | ICD-10-CM

## 2023-07-26 DIAGNOSIS — R07.2 PRECORDIAL PAIN: Primary | ICD-10-CM

## 2023-07-26 PROCEDURE — 1159F MED LIST DOCD IN RCRD: CPT | Mod: CPTII,S$GLB,, | Performed by: INTERNAL MEDICINE

## 2023-07-26 PROCEDURE — 3074F PR MOST RECENT SYSTOLIC BLOOD PRESSURE < 130 MM HG: ICD-10-PCS | Mod: CPTII,S$GLB,, | Performed by: INTERNAL MEDICINE

## 2023-07-26 PROCEDURE — 3078F PR MOST RECENT DIASTOLIC BLOOD PRESSURE < 80 MM HG: ICD-10-PCS | Mod: CPTII,S$GLB,, | Performed by: INTERNAL MEDICINE

## 2023-07-26 PROCEDURE — 3008F PR BODY MASS INDEX (BMI) DOCUMENTED: ICD-10-PCS | Mod: CPTII,S$GLB,, | Performed by: INTERNAL MEDICINE

## 2023-07-26 PROCEDURE — 99214 PR OFFICE/OUTPT VISIT, EST, LEVL IV, 30-39 MIN: ICD-10-PCS | Mod: S$GLB,,, | Performed by: INTERNAL MEDICINE

## 2023-07-26 PROCEDURE — 3078F DIAST BP <80 MM HG: CPT | Mod: CPTII,S$GLB,, | Performed by: INTERNAL MEDICINE

## 2023-07-26 PROCEDURE — 1159F PR MEDICATION LIST DOCUMENTED IN MEDICAL RECORD: ICD-10-PCS | Mod: CPTII,S$GLB,, | Performed by: INTERNAL MEDICINE

## 2023-07-26 PROCEDURE — 3074F SYST BP LT 130 MM HG: CPT | Mod: CPTII,S$GLB,, | Performed by: INTERNAL MEDICINE

## 2023-07-26 PROCEDURE — 3008F BODY MASS INDEX DOCD: CPT | Mod: CPTII,S$GLB,, | Performed by: INTERNAL MEDICINE

## 2023-07-26 PROCEDURE — 99214 OFFICE O/P EST MOD 30 MIN: CPT | Mod: S$GLB,,, | Performed by: INTERNAL MEDICINE

## 2023-07-26 RX ORDER — PRAVASTATIN SODIUM 10 MG/1
10 TABLET ORAL DAILY
Qty: 90 TABLET | Refills: 1 | Status: SHIPPED | OUTPATIENT
Start: 2023-07-26 | End: 2024-07-25

## 2023-08-01 ENCOUNTER — LAB VISIT (OUTPATIENT)
Dept: LAB | Facility: HOSPITAL | Age: 43
End: 2023-08-01
Attending: INTERNAL MEDICINE
Payer: COMMERCIAL

## 2023-08-01 ENCOUNTER — CLINICAL SUPPORT (OUTPATIENT)
Dept: CARDIOLOGY | Facility: HOSPITAL | Age: 43
End: 2023-08-01
Attending: INTERNAL MEDICINE
Payer: COMMERCIAL

## 2023-08-01 VITALS — WEIGHT: 222 LBS | HEIGHT: 75 IN | BODY MASS INDEX: 27.6 KG/M2

## 2023-08-01 DIAGNOSIS — I34.0 NON-RHEUMATIC MITRAL REGURGITATION: Chronic | ICD-10-CM

## 2023-08-01 DIAGNOSIS — R07.2 PRECORDIAL PAIN: ICD-10-CM

## 2023-08-01 DIAGNOSIS — L74.9 SWEATING ABNORMALITY: ICD-10-CM

## 2023-08-01 LAB
CV STRESS BASE HR: 82 BPM
DIASTOLIC BLOOD PRESSURE: 63 MMHG
OHS CV CPX 1 MINUTE RECOVERY HEART RATE: 130 BPM
OHS CV CPX 85 PERCENT MAX PREDICTED HEART RATE MALE: 150
OHS CV CPX ESTIMATED METS: 12
OHS CV CPX MAX PREDICTED HEART RATE: 177
OHS CV CPX PATIENT IS FEMALE: 0
OHS CV CPX PATIENT IS MALE: 1
OHS CV CPX PEAK DIASTOLIC BLOOD PRESSURE: 75 MMHG
OHS CV CPX PEAK HEAR RATE: 169 BPM
OHS CV CPX PEAK RATE PRESSURE PRODUCT: NORMAL
OHS CV CPX PEAK SYSTOLIC BLOOD PRESSURE: 160 MMHG
OHS CV CPX PERCENT MAX PREDICTED HEART RATE ACHIEVED: 95
OHS CV CPX RATE PRESSURE PRODUCT PRESENTING: 9348
STRESS ECHO POST EXERCISE DUR MIN: 6 MINUTES
STRESS ECHO POST EXERCISE DUR SEC: 48 SECONDS
SYSTOLIC BLOOD PRESSURE: 114 MMHG
TSH SERPL DL<=0.005 MIU/L-ACNC: 2.33 UIU/ML (ref 0.4–4)

## 2023-08-01 PROCEDURE — 93016 EXERCISE STRESS - EKG (CUPID ONLY): ICD-10-PCS | Mod: ,,, | Performed by: INTERNAL MEDICINE

## 2023-08-01 PROCEDURE — 93016 CV STRESS TEST SUPVJ ONLY: CPT | Mod: ,,, | Performed by: INTERNAL MEDICINE

## 2023-08-01 PROCEDURE — 93018 EXERCISE STRESS - EKG (CUPID ONLY): ICD-10-PCS | Mod: ,,, | Performed by: INTERNAL MEDICINE

## 2023-08-01 PROCEDURE — 84443 ASSAY THYROID STIM HORMONE: CPT | Performed by: INTERNAL MEDICINE

## 2023-08-01 PROCEDURE — 93017 CV STRESS TEST TRACING ONLY: CPT | Mod: PO

## 2023-08-01 PROCEDURE — 93018 CV STRESS TEST I&R ONLY: CPT | Mod: ,,, | Performed by: INTERNAL MEDICINE

## 2023-08-01 PROCEDURE — 36415 COLL VENOUS BLD VENIPUNCTURE: CPT | Mod: PO | Performed by: INTERNAL MEDICINE

## 2023-10-25 ENCOUNTER — OFFICE VISIT (OUTPATIENT)
Dept: CARDIOLOGY | Facility: CLINIC | Age: 43
End: 2023-10-25
Payer: COMMERCIAL

## 2023-10-25 VITALS
HEART RATE: 73 BPM | BODY MASS INDEX: 29.11 KG/M2 | WEIGHT: 234.13 LBS | SYSTOLIC BLOOD PRESSURE: 118 MMHG | HEIGHT: 75 IN | DIASTOLIC BLOOD PRESSURE: 66 MMHG

## 2023-10-25 DIAGNOSIS — Q24.5 CORONARY-MYOCARDIAL BRIDGE: Chronic | ICD-10-CM

## 2023-10-25 DIAGNOSIS — I34.0 NON-RHEUMATIC MITRAL REGURGITATION: Chronic | ICD-10-CM

## 2023-10-25 DIAGNOSIS — R07.2 PRECORDIAL PAIN: Chronic | ICD-10-CM

## 2023-10-25 DIAGNOSIS — R42 DIZZINESS: Chronic | ICD-10-CM

## 2023-10-25 DIAGNOSIS — R55 NEAR SYNCOPE: Primary | ICD-10-CM

## 2023-10-25 DIAGNOSIS — E66.3 OVERWEIGHT (BMI 25.0-29.9): Chronic | ICD-10-CM

## 2023-10-25 DIAGNOSIS — R07.89 CHEST WALL PAIN: ICD-10-CM

## 2023-10-25 DIAGNOSIS — I49.3 PVC'S (PREMATURE VENTRICULAR CONTRACTIONS): Chronic | ICD-10-CM

## 2023-10-25 PROCEDURE — 3008F PR BODY MASS INDEX (BMI) DOCUMENTED: ICD-10-PCS | Mod: CPTII,S$GLB,, | Performed by: INTERNAL MEDICINE

## 2023-10-25 PROCEDURE — 1159F PR MEDICATION LIST DOCUMENTED IN MEDICAL RECORD: ICD-10-PCS | Mod: CPTII,S$GLB,, | Performed by: INTERNAL MEDICINE

## 2023-10-25 PROCEDURE — 3008F BODY MASS INDEX DOCD: CPT | Mod: CPTII,S$GLB,, | Performed by: INTERNAL MEDICINE

## 2023-10-25 PROCEDURE — 99214 PR OFFICE/OUTPT VISIT, EST, LEVL IV, 30-39 MIN: ICD-10-PCS | Mod: S$GLB,,, | Performed by: INTERNAL MEDICINE

## 2023-10-25 PROCEDURE — 3074F PR MOST RECENT SYSTOLIC BLOOD PRESSURE < 130 MM HG: ICD-10-PCS | Mod: CPTII,S$GLB,, | Performed by: INTERNAL MEDICINE

## 2023-10-25 PROCEDURE — 3078F DIAST BP <80 MM HG: CPT | Mod: CPTII,S$GLB,, | Performed by: INTERNAL MEDICINE

## 2023-10-25 PROCEDURE — 3074F SYST BP LT 130 MM HG: CPT | Mod: CPTII,S$GLB,, | Performed by: INTERNAL MEDICINE

## 2023-10-25 PROCEDURE — 3078F PR MOST RECENT DIASTOLIC BLOOD PRESSURE < 80 MM HG: ICD-10-PCS | Mod: CPTII,S$GLB,, | Performed by: INTERNAL MEDICINE

## 2023-10-25 PROCEDURE — 99214 OFFICE O/P EST MOD 30 MIN: CPT | Mod: S$GLB,,, | Performed by: INTERNAL MEDICINE

## 2023-10-25 PROCEDURE — 1159F MED LIST DOCD IN RCRD: CPT | Mod: CPTII,S$GLB,, | Performed by: INTERNAL MEDICINE

## 2023-10-25 NOTE — PROGRESS NOTES
"Subjective:    Patient ID:  Jaren Breen is a 43 y.o. male who presents for precordial pain (Dizziness. "Blacked out about a week ago")        HPI    DISCUSSED TEST, NEGATIVE STRESS EKG OCC PVC'S. CHRONIC L SIDED PAIN NECK ARM---,  A MONTH AGO  RTHIGH PAIN  THEN RESOLVED, THEN L SHOULDER, SPNTANOUS REOLUTION / TYLENOL, RECENT NER SYNCOPE, WAS SITTING, VITALS WERE OK /70, EMPTY FEELING IN CHEST,  ?? PALP, CHRONIC DIZZINESS, STATES FEELS IN LOWER BODY, RECENT CXR IN SEP OK, WAS COUGHING UP BLOOD, BAD FLU SX, PCP, SPINE X RAY OK, TSH OK SEE ROS  Past Medical History:   Diagnosis Date    Anticoagulant long-term use     Arthritis     Heart murmur     Hypercholesterolemia 9/19/2018    Stomach ulcer     Valvular regurgitation      Past Surgical History:   Procedure Laterality Date    CARDIAC CATHETERIZATION      CHOLECYSTECTOMY  2017    CORONARY ANGIOGRAPHY N/A 3/10/2020    Procedure: ANGIOGRAM, CORONARY ARTERY;  Surgeon: Gianni Milner MD;  [facility]: [facility];  Service: Cardiology;  Laterality: N/A;    ESOPHAGOGASTRODUODENOSCOPY      LEFT HEART CATHETERIZATION Left 3/10/2020    Procedure: Left heart cath;  Surgeon: Gianni Milner MD;  [facility];  Service: Cardiology;  Laterality: Left;     Family History   Problem Relation Age of Onset    Heart disease Mother     Hypertension Father      Social History     Socioeconomic History    Marital status:    Tobacco Use    Smoking status: Former     Current packs/day: 0.00     Types: Cigarettes     Start date: 2004     Quit date: 2013     Years since quitting: 10.8    Smokeless tobacco: Never   Substance and Sexual Activity    Alcohol use: Yes     Comment: Occasional    Drug use: No       Review of patient's allergies indicates:  No Known Allergies    Current Outpatient Medications:     aspirin 81 MG Chew, 81 mg once daily. , Disp: , Rfl:     dexlansoprazole (DEXILANT) 60 mg capsule, Take 60 mg by mouth., Disp: , Rfl:     metoprolol succinate " "(TOPROL-XL) 25 MG 24 hr tablet, Take 0.5 tablets (12.5 mg total) by mouth once daily., Disp: 45 tablet, Rfl: 1    pravastatin (PRAVACHOL) 10 MG tablet, Take 1 tablet (10 mg total) by mouth once daily., Disp: 90 tablet, Rfl: 1    sucralfate (CARAFATE) 1 gram tablet, Take 1 g by mouth once daily. , Disp: , Rfl:     Review of Systems   Constitutional: Negative for chills, diaphoresis, fever, malaise/fatigue, night sweats and weight loss.   HENT:  Negative for congestion and nosebleeds.    Eyes:  Negative for blurred vision and visual disturbance.   Cardiovascular:  Positive for chest pain, dyspnea on exertion (MILD STAIRS) and near-syncope. Negative for claudication, cyanosis, irregular heartbeat, leg swelling, orthopnea, palpitations, paroxysmal nocturnal dyspnea and syncope.   Respiratory:  Negative for cough, hemoptysis, shortness of breath and wheezing.    Endocrine: Negative for polyphagia and polyuria.        SWEATING   Hematologic/Lymphatic: Negative for adenopathy. Does not bruise/bleed easily.   Skin:  Negative for color change and rash.   Musculoskeletal:  Positive for joint pain (SHOULDER). Negative for back pain (CHRONIC) and falls.   Gastrointestinal:  Positive for heartburn (STABLE). Negative for abdominal pain, change in bowel habit, dysphagia, jaundice, melena and nausea.   Genitourinary:  Negative for dysuria and flank pain.   Neurological:  Positive for dizziness and tremors. Negative for brief paralysis, focal weakness, headaches, light-headedness and weakness.   Psychiatric/Behavioral:  Negative for altered mental status and depression.         Objective:      Vitals:    10/25/23 0825   BP: 118/66   Pulse: 73   Weight: 106.2 kg (234 lb 2.1 oz)   Height: 6' 3" (1.905 m)   PainSc:   1     Body mass index is 29.26 kg/m².    Physical Exam  Constitutional:       Appearance: He is well-developed and overweight.   HENT:      Head: Normocephalic and atraumatic.   Eyes:      General: No scleral icterus.     " Extraocular Movements: Extraocular movements intact.   Neck:      Vascular: Normal carotid pulses. No carotid bruit or JVD.   Cardiovascular:      Rate and Rhythm: Normal rate and regular rhythm. No extrasystoles are present.     Pulses:           Carotid pulses are 2+ on the right side and 2+ on the left side.       Radial pulses are 2+ on the right side and 2+ on the left side.        Femoral pulses are 2+ on the right side and 2+ on the left side.       Posterior tibial pulses are 2+ on the right side and 2+ on the left side.      Heart sounds: Murmur heard.      Systolic murmur is present with a grade of 1/6 at the lower left sternal border and apex.      No friction rub. No gallop.   Pulmonary:      Effort: Pulmonary effort is normal.      Breath sounds: Normal breath sounds. No rales.   Chest:      Chest wall: No tenderness.   Abdominal:      Palpations: Abdomen is soft. There is no hepatomegaly.      Tenderness: There is no abdominal tenderness.   Musculoskeletal:         General: Normal range of motion.      Cervical back: Neck supple.      Right lower leg: No edema.      Left lower leg: No edema.      Comments: NO ANKLE EDEMA   Skin:     General: Skin is warm and dry.      Capillary Refill: Capillary refill takes less than 2 seconds.   Neurological:      General: No focal deficit present.      Mental Status: He is alert and oriented to person, place, and time.      Cranial Nerves: No cranial nerve deficit.   Psychiatric:         Mood and Affect: Mood normal.         Speech: Speech normal.         Behavior: Behavior normal.                 ..    Chemistry        Component Value Date/Time     06/14/2023 0920    K 4.5 06/14/2023 0920     06/14/2023 0920    CO2 28 06/14/2023 0920    BUN 17 06/14/2023 0920    CREATININE 1.0 06/14/2023 0920    GLU 95 06/14/2023 0920        Component Value Date/Time    CALCIUM 9.6 06/14/2023 0920    ALKPHOS 50 (L) 06/14/2023 0920    AST 20 06/14/2023 0920    AST 31  05/11/2016 1820    ALT 20 06/14/2023 0920    BILITOT 0.6 06/14/2023 0920    ESTGFRAFRICA 109 05/23/2023 1037    ESTGFRAFRICA >60 03/10/2020 0840    EGFRNONAA >60 03/10/2020 0840            ..  Lab Results   Component Value Date    CHOL 198 06/14/2023    CHOL 199 11/17/2022    CHOL 204 (H) 03/24/2022     Lab Results   Component Value Date    HDL 51 06/14/2023    HDL 58 11/17/2022    HDL 46 03/24/2022     Lab Results   Component Value Date    LDLCALC 135.4 06/14/2023    LDLCALC 130.0 11/17/2022    LDLCALC 146 (H) 03/24/2022     Lab Results   Component Value Date    TRIG 58 06/14/2023    TRIG 55 11/17/2022    TRIG 67 03/24/2022     Lab Results   Component Value Date    CHOLHDL 25.8 06/14/2023    CHOLHDL 29.1 11/17/2022    CHOLHDL 22.1 09/13/2021     ..  Lab Results   Component Value Date    WBC 7.5 06/15/2023    HGB 13.5 06/15/2023    HCT 41.1 06/15/2023    MCV 91 03/10/2020     06/15/2023       Test(s) Reviewed  I have reviewed the following in detail:  [x] Stress test   [] Angiography   [] Echocardiogram   [x] Labs   [x] Other:       Assessment:         ICD-10-CM ICD-9-CM   1. Near syncope  R55 780.2   2. Non-rheumatic mitral regurgitation  I34.0 424.0   3. PVC's (premature ventricular contractions)  I49.3 427.69   4. Coronary-myocardial bridge  Q24.5 746.85   5. Overweight (BMI 25.0-29.9)  E66.3 278.02   6. Chest wall pain  R07.89 786.52   7. Dizziness  R42 780.4   8. Precordial pain  R07.2 786.51     Problem List Items Addressed This Visit          Cardiac/Vascular    Non-rheumatic mitral regurgitation    Relevant Orders    Echo    PVC's (premature ventricular contractions)    Relevant Orders    Cardiac event monitor    Precordial pain    Relevant Orders    Echo    Sedimentation rate    C-REACTIVE PROTEIN    Coronary-myocardial bridge       Endocrine    Overweight (BMI 25.0-29.9)       Orthopedic    Chest wall pain    Relevant Orders    Sedimentation rate    C-REACTIVE PROTEIN       Other    Dizziness     Near syncope - Primary    Relevant Orders    Echo    CV Ultrasound Bilateral Doppler Carotid    Cardiac event monitor        Plan:     ECHO, CAROTIDS, 4 WEEKS EVENT MONITOR IN VIEW OF SYMPTOMS IS RECURRENT AND VAGUE AND ROTATING, NO ANGINA NO OVERT HEART FAILURE NO TIA TYPE SYMPTOMS , STAY ACTIVE HYDRATED, RETURN TO CLINIC IN FEW WEEKS AFTER TESTS      Near syncope  -     Echo  -     CV Ultrasound Bilateral Doppler Carotid; Future  -     Cardiac event monitor; Future    Non-rheumatic mitral regurgitation  -     Echo    PVC's (premature ventricular contractions)  -     Cardiac event monitor; Future    Coronary-myocardial bridge    Overweight (BMI 25.0-29.9)    Chest wall pain  -     Sedimentation rate; Future; Expected date: 10/25/2023  -     C-REACTIVE PROTEIN; Future; Expected date: 10/25/2023    Dizziness    Precordial pain  -     Echo  -     Sedimentation rate; Future; Expected date: 10/25/2023  -     C-REACTIVE PROTEIN; Future; Expected date: 10/25/2023    RTC Low level/low impact aerobic exercise 5x's/wk. Heart healthy diet and risk factor modification.    See labs and med orders.    Aerobic exercise 5x's/wk. Heart healthy diet and risk factor modification.    See labs and med orders.

## 2023-10-26 ENCOUNTER — LAB VISIT (OUTPATIENT)
Dept: PRIMARY CARE CLINIC | Facility: CLINIC | Age: 43
End: 2023-10-26
Payer: COMMERCIAL

## 2023-10-26 ENCOUNTER — CLINICAL SUPPORT (OUTPATIENT)
Dept: CARDIOLOGY | Facility: CLINIC | Age: 43
End: 2023-10-26
Attending: INTERNAL MEDICINE
Payer: COMMERCIAL

## 2023-10-26 VITALS — HEIGHT: 75 IN | WEIGHT: 234 LBS | BODY MASS INDEX: 29.09 KG/M2

## 2023-10-26 DIAGNOSIS — R07.2 PRECORDIAL PAIN: Chronic | ICD-10-CM

## 2023-10-26 DIAGNOSIS — R55 NEAR SYNCOPE: ICD-10-CM

## 2023-10-26 DIAGNOSIS — R07.89 CHEST WALL PAIN: ICD-10-CM

## 2023-10-26 LAB
ASCENDING AORTA: 3.46 CM
AV INDEX (PROSTH): 1
AV MEAN GRADIENT: 2 MMHG
AV PEAK GRADIENT: 4 MMHG
AV VALVE AREA BY VELOCITY RATIO: 4.71 CM²
AV VALVE AREA: 4.71 CM²
AV VELOCITY RATIO: 1
BSA FOR ECHO PROCEDURE: 2.37 M2
CRP SERPL-MCNC: 0.4 MG/L (ref 0–8.2)
CV ECHO LV RWT: 0.31 CM
DOP CALC AO PEAK VEL: 1.02 M/S
DOP CALC AO VTI: 23.1 CM
DOP CALC LVOT AREA: 4.7 CM2
DOP CALC LVOT DIAMETER: 2.45 CM
DOP CALC LVOT PEAK VEL: 1.02 M/S
DOP CALC LVOT STROKE VOLUME: 108.85 CM3
DOP CALCLVOT PEAK VEL VTI: 23.1 CM
E WAVE DECELERATION TIME: 172.08 MSEC
E/A RATIO: 1.24
E/E' RATIO: 4.69 M/S
ECHO LV POSTERIOR WALL: 0.9 CM (ref 0.6–1.1)
EJECTION FRACTION: 60 %
ERYTHROCYTE [SEDIMENTATION RATE] IN BLOOD BY PHOTOMETRIC METHOD: 5 MM/HR (ref 0–23)
FRACTIONAL SHORTENING: 31 % (ref 28–44)
INTERVENTRICULAR SEPTUM: 1.01 CM (ref 0.6–1.1)
LEFT ARM DIASTOLIC BLOOD PRESSURE: 66 MMHG
LEFT ARM SYSTOLIC BLOOD PRESSURE: 118 MMHG
LEFT ATRIUM VOLUME INDEX MOD: 33 ML/M2
LEFT ATRIUM VOLUME MOD: 77.44 CM3
LEFT CBA DIAS: 14 CM/S
LEFT CBA SYS: 78 CM/S
LEFT CCA DIST DIAS: 16 CM/S
LEFT CCA DIST SYS: 87 CM/S
LEFT CCA MID DIAS: 14 CM/S
LEFT CCA MID SYS: 88 CM/S
LEFT CCA PROX DIAS: 20 CM/S
LEFT CCA PROX SYS: 107 CM/S
LEFT ECA DIAS: 10 CM/S
LEFT ECA SYS: 84 CM/S
LEFT ICA DIST DIAS: 32 CM/S
LEFT ICA DIST SYS: 72 CM/S
LEFT ICA MID DIAS: 28 CM/S
LEFT ICA MID SYS: 69 CM/S
LEFT ICA PROX DIAS: 34 CM/S
LEFT ICA PROX SYS: 68 CM/S
LEFT INTERNAL DIMENSION IN SYSTOLE: 3.98 CM (ref 2.1–4)
LEFT VENTRICLE DIASTOLIC VOLUME INDEX: 70.14 ML/M2
LEFT VENTRICLE DIASTOLIC VOLUME: 164.84 ML
LEFT VENTRICLE MASS INDEX: 93 G/M2
LEFT VENTRICLE SYSTOLIC VOLUME INDEX: 29.5 ML/M2
LEFT VENTRICLE SYSTOLIC VOLUME: 69.33 ML
LEFT VENTRICULAR INTERNAL DIMENSION IN DIASTOLE: 5.77 CM (ref 3.5–6)
LEFT VENTRICULAR MASS: 217.66 G
LEFT VERTEBRAL DIAS: 15 CM/S
LEFT VERTEBRAL SYS: 36 CM/S
LV LATERAL E/E' RATIO: 4 M/S
LV SEPTAL E/E' RATIO: 5.67 M/S
LVOT MG: 2 MMHG
LVOT MV: 0.64 CM/S
MV PEAK A VEL: 0.55 M/S
MV PEAK E VEL: 0.68 M/S
MV STENOSIS PRESSURE HALF TIME: 49.9 MS
MV VALVE AREA P 1/2 METHOD: 4.41 CM2
OHS CV CAROTID RIGHT ICA EDV HIGHEST: 31
OHS CV CAROTID ULTRASOUND LEFT ICA/CCA RATIO: 0.83
OHS CV CAROTID ULTRASOUND RIGHT ICA/CCA RATIO: 1.01
OHS CV PV CAROTID LEFT HIGHEST CCA: 107
OHS CV PV CAROTID LEFT HIGHEST ICA: 72
OHS CV PV CAROTID RIGHT HIGHEST CCA: 113
OHS CV PV CAROTID RIGHT HIGHEST ICA: 75
OHS CV US CAROTID LEFT HIGHEST EDV: 34
PISA TR MAX VEL: 2.2 M/S
PULM VEIN S/D RATIO: 1.18
PV PEAK D VEL: 0.44 M/S
PV PEAK S VEL: 0.52 M/S
RA PRESSURE ESTIMATED: 3 MMHG
RIGHT ARM DIASTOLIC BLOOD PRESSURE: 66 MMHG
RIGHT ARM SYSTOLIC BLOOD PRESSURE: 118 MMHG
RIGHT CBA DIAS: 18 CM/S
RIGHT CBA SYS: 55 CM/S
RIGHT CCA DIST DIAS: 14 CM/S
RIGHT CCA DIST SYS: 74 CM/S
RIGHT CCA MID DIAS: 20 CM/S
RIGHT CCA MID SYS: 105 CM/S
RIGHT CCA PROX DIAS: 25 CM/S
RIGHT CCA PROX SYS: 113 CM/S
RIGHT ECA DIAS: 14 CM/S
RIGHT ECA SYS: 78 CM/S
RIGHT ICA DIST DIAS: 31 CM/S
RIGHT ICA DIST SYS: 75 CM/S
RIGHT ICA MID DIAS: 31 CM/S
RIGHT ICA MID SYS: 71 CM/S
RIGHT ICA PROX DIAS: 21 CM/S
RIGHT ICA PROX SYS: 60 CM/S
RIGHT VENTRICULAR END-DIASTOLIC DIMENSION: 3.73 CM
RIGHT VENTRICULAR LENGTH IN DIASTOLE (APICAL 4-CHAMBER VIEW): 8.52 CM
RIGHT VERTEBRAL DIAS: 13 CM/S
RIGHT VERTEBRAL SYS: 41 CM/S
RV MID DIAMA: 3.21 CM
RV TB RVSP: 5 MMHG
RV TISSUE DOPPLER FREE WALL SYSTOLIC VELOCITY 1 (APICAL 4 CHAMBER VIEW): 14.31 CM/S
SINUS: 3.44 CM
STJ: 3.24 CM
TDI LATERAL: 0.17 M/S
TDI SEPTAL: 0.12 M/S
TDI: 0.15 M/S
TR MAX PG: 19 MMHG
TRICUSPID ANNULAR PLANE SYSTOLIC EXCURSION: 2.75 CM
TV REST PULMONARY ARTERY PRESSURE: 22 MMHG
Z-SCORE OF LEFT VENTRICULAR DIMENSION IN END DIASTOLE: -5.33
Z-SCORE OF LEFT VENTRICULAR DIMENSION IN END SYSTOLE: -3.12

## 2023-10-26 PROCEDURE — 85652 RBC SED RATE AUTOMATED: CPT | Performed by: INTERNAL MEDICINE

## 2023-10-26 PROCEDURE — 93880 EXTRACRANIAL BILAT STUDY: CPT | Mod: S$GLB,,, | Performed by: INTERNAL MEDICINE

## 2023-10-26 PROCEDURE — 36415 COLL VENOUS BLD VENIPUNCTURE: CPT | Mod: S$GLB,,, | Performed by: INTERNAL MEDICINE

## 2023-10-26 PROCEDURE — 86140 C-REACTIVE PROTEIN: CPT | Performed by: INTERNAL MEDICINE

## 2023-10-26 PROCEDURE — 36415 PR COLLECTION VENOUS BLOOD,VENIPUNCTURE: ICD-10-PCS | Mod: S$GLB,,, | Performed by: INTERNAL MEDICINE

## 2023-10-26 PROCEDURE — 93880 CV US DOPPLER CAROTID (CUPID ONLY): ICD-10-PCS | Mod: S$GLB,,, | Performed by: INTERNAL MEDICINE

## 2023-11-01 ENCOUNTER — CLINICAL SUPPORT (OUTPATIENT)
Dept: CARDIOLOGY | Facility: HOSPITAL | Age: 43
End: 2023-11-01
Attending: INTERNAL MEDICINE
Payer: COMMERCIAL

## 2023-11-01 DIAGNOSIS — R55 NEAR SYNCOPE: ICD-10-CM

## 2023-11-01 DIAGNOSIS — I49.3 PVC'S (PREMATURE VENTRICULAR CONTRACTIONS): Chronic | ICD-10-CM

## 2023-11-01 PROCEDURE — 93272 CARDIAC EVENT MONITOR (CUPID ONLY): ICD-10-PCS | Mod: ,,, | Performed by: INTERNAL MEDICINE

## 2023-11-01 PROCEDURE — 93272 ECG/REVIEW INTERPRET ONLY: CPT | Mod: ,,, | Performed by: INTERNAL MEDICINE

## 2023-11-01 PROCEDURE — 93270 REMOTE 30 DAY ECG REV/REPORT: CPT | Mod: PO

## 2023-11-01 PROCEDURE — 93271 ECG/MONITORING AND ANALYSIS: CPT | Mod: PO

## 2024-01-03 ENCOUNTER — OFFICE VISIT (OUTPATIENT)
Dept: CARDIOLOGY | Facility: CLINIC | Age: 44
End: 2024-01-03
Payer: COMMERCIAL

## 2024-01-03 VITALS
BODY MASS INDEX: 29.99 KG/M2 | WEIGHT: 241.19 LBS | HEIGHT: 75 IN | SYSTOLIC BLOOD PRESSURE: 136 MMHG | DIASTOLIC BLOOD PRESSURE: 60 MMHG | HEART RATE: 84 BPM

## 2024-01-03 DIAGNOSIS — E78.00 HYPERCHOLESTEROLEMIA: Chronic | ICD-10-CM

## 2024-01-03 DIAGNOSIS — Q24.5 CORONARY-MYOCARDIAL BRIDGE: Chronic | ICD-10-CM

## 2024-01-03 DIAGNOSIS — I49.3 FREQUENT UNIFOCAL PVCS: Primary | ICD-10-CM

## 2024-01-03 DIAGNOSIS — I34.0 NON-RHEUMATIC MITRAL REGURGITATION: Chronic | ICD-10-CM

## 2024-01-03 DIAGNOSIS — E66.9 OBESITY, CLASS I, BMI 30-34.9: Chronic | ICD-10-CM

## 2024-01-03 DIAGNOSIS — I77.810 MILD ASCENDING AORTA DILATATION: ICD-10-CM

## 2024-01-03 PROCEDURE — 3008F BODY MASS INDEX DOCD: CPT | Mod: CPTII,S$GLB,, | Performed by: INTERNAL MEDICINE

## 2024-01-03 PROCEDURE — 3075F SYST BP GE 130 - 139MM HG: CPT | Mod: CPTII,S$GLB,, | Performed by: INTERNAL MEDICINE

## 2024-01-03 PROCEDURE — 1159F MED LIST DOCD IN RCRD: CPT | Mod: CPTII,S$GLB,, | Performed by: INTERNAL MEDICINE

## 2024-01-03 PROCEDURE — 99214 OFFICE O/P EST MOD 30 MIN: CPT | Mod: S$GLB,,, | Performed by: INTERNAL MEDICINE

## 2024-01-03 PROCEDURE — 3078F DIAST BP <80 MM HG: CPT | Mod: CPTII,S$GLB,, | Performed by: INTERNAL MEDICINE

## 2024-01-03 RX ORDER — METOPROLOL SUCCINATE 25 MG/1
25 TABLET, EXTENDED RELEASE ORAL DAILY
Qty: 90 TABLET | Refills: 1 | Status: SHIPPED | OUTPATIENT
Start: 2024-01-03 | End: 2025-01-02

## 2024-01-03 NOTE — PROGRESS NOTES
Subjective:    Patient ID:  Jaren Breen is a 43 y.o. male who presents for Heart Problem        HPI  DISCUSSED TESTS, NORMAL CAROTIDS ECHO NORMAL LV FUNCTION MILD MR MILD DILATED ASCENDING AORTA, NEGATIVE STRESS EKG, EVENT MONITOR FREQUENT PVCS BURDEN OF 4%, DOING OK, MISSED TOPROL FOR 2 WEEKS, WAS ON VACATION, SEE ROS    Past Medical History:   Diagnosis Date    Anticoagulant long-term use     Arthritis     Heart murmur     Hypercholesterolemia 2018    Stomach ulcer     Valvular regurgitation      Past Surgical History:   Procedure Laterality Date    CARDIAC CATHETERIZATION      CHOLECYSTECTOMY  2017    CORONARY ANGIOGRAPHY N/A 3/10/2020    Procedure: ANGIOGRAM, CORONARY ARTERY;  Surgeon: Gianni Milner MD;  Location: [facility] CATH;  Service: Cardiology;  Laterality: N/A;    ESOPHAGOGASTRODUODENOSCOPY      LEFT HEART CATHETERIZATION Left 3/10/2020    Procedure: Left heart cath;  Surgeon: Gianni Milner MD;  Location: [facility] CATH;  Service: Cardiology;  Laterality: Left;     Family History   Problem Relation Age of Onset    Heart disease Mother     Hypertension Father      Social History     Socioeconomic History    Marital status:    Tobacco Use    Smoking status: Former     Current packs/day: 0.00     Types: Cigarettes     Start date:      Quit date:      Years since quittin.0    Smokeless tobacco: Never   Substance and Sexual Activity    Alcohol use: Yes     Comment: Occasional    Drug use: No       Review of patient's allergies indicates:  No Known Allergies    Current Outpatient Medications:     aspirin 81 MG Chew, 81 mg once daily. , Disp: , Rfl:     dexlansoprazole (DEXILANT) 60 mg capsule, Take 60 mg by mouth., Disp: , Rfl:     pravastatin (PRAVACHOL) 10 MG tablet, Take 1 tablet (10 mg total) by mouth once daily., Disp: 90 tablet, Rfl: 1    sucralfate (CARAFATE) 1 gram tablet, Take 1 g by mouth once daily. , Disp: , Rfl:     metoprolol succinate (TOPROL-XL) 25 MG 24 hr tablet, Take  "1 tablet (25 mg total) by mouth once daily., Disp: 90 tablet, Rfl: 1    Review of Systems   Constitutional: Positive for weight gain. Negative for chills, diaphoresis, fever, malaise/fatigue and night sweats.   HENT:  Negative for congestion and nosebleeds.    Eyes:  Negative for blurred vision and visual disturbance.   Cardiovascular:  Positive for dyspnea on exertion (MILD). Negative for chest pain, claudication, cyanosis, irregular heartbeat (OCC), leg swelling, near-syncope, orthopnea, palpitations (NOT FELT), paroxysmal nocturnal dyspnea and syncope.   Respiratory:  Negative for cough, hemoptysis, shortness of breath and wheezing.    Endocrine: Negative for polyphagia and polyuria.        SWEATING   Hematologic/Lymphatic: Negative for adenopathy. Does not bruise/bleed easily.   Skin:  Negative for color change and rash.   Musculoskeletal:  Positive for joint pain (SHOULDER). Negative for back pain (CHRONIC) and falls.   Gastrointestinal:  Positive for heartburn (STABLE). Negative for abdominal pain, change in bowel habit, dysphagia, jaundice, melena and nausea.   Genitourinary:  Negative for dysuria and flank pain.   Neurological:  Negative for brief paralysis, dizziness, focal weakness, headaches, light-headedness and weakness.   Psychiatric/Behavioral:  Negative for altered mental status and depression.         Objective:      Vitals:    01/03/24 1515 01/03/24 1551   BP: (!) 144/62 136/60   Pulse: 84    Weight: 109.4 kg (241 lb 2.9 oz)    Height: 6' 3" (1.905 m)      Body mass index is 30.15 kg/m².    Physical Exam  Constitutional:       Appearance: Normal appearance. He is well-developed and overweight.   HENT:      Head: Normocephalic and atraumatic.   Eyes:      Extraocular Movements: Extraocular movements intact.      Conjunctiva/sclera: Conjunctivae normal.   Neck:      Vascular: Normal carotid pulses. No carotid bruit or JVD.   Cardiovascular:      Rate and Rhythm: Normal rate and regular rhythm. No " extrasystoles are present.     Pulses:           Carotid pulses are 2+ on the right side and 2+ on the left side.       Radial pulses are 2+ on the right side and 2+ on the left side.        Femoral pulses are 2+ on the right side and 2+ on the left side.       Posterior tibial pulses are 2+ on the right side and 2+ on the left side.      Heart sounds: Murmur heard.      Systolic murmur is present with a grade of 1/6 at the lower left sternal border and apex.      No friction rub. No gallop.   Pulmonary:      Effort: Pulmonary effort is normal.      Breath sounds: Normal breath sounds and air entry. No rales.   Abdominal:      Palpations: Abdomen is soft. There is no hepatomegaly.      Tenderness: There is no abdominal tenderness.   Musculoskeletal:         General: Normal range of motion.      Cervical back: Neck supple.      Right lower leg: No edema.      Left lower leg: No edema.      Comments: NO ANKLE EDEMA   Skin:     General: Skin is warm and dry.      Capillary Refill: Capillary refill takes less than 2 seconds.   Neurological:      General: No focal deficit present.      Mental Status: He is alert and oriented to person, place, and time.      Cranial Nerves: No cranial nerve deficit.   Psychiatric:         Mood and Affect: Mood normal.         Speech: Speech normal.         Behavior: Behavior normal.                 ..    Chemistry        Component Value Date/Time     06/14/2023 0920    K 4.5 06/14/2023 0920     06/14/2023 0920    CO2 28 06/14/2023 0920    BUN 17 06/14/2023 0920    CREATININE 1.0 06/14/2023 0920    GLU 95 06/14/2023 0920        Component Value Date/Time    CALCIUM 9.6 06/14/2023 0920    ALKPHOS 50 (L) 06/14/2023 0920    AST 20 06/14/2023 0920    AST 31 05/11/2016 1820    ALT 20 06/14/2023 0920    BILITOT 0.6 06/14/2023 0920    ESTGFRAFRICA 109 05/23/2023 1037    ESTGFRAFRICA >60 03/10/2020 0840    EGFRNONAA >60 03/10/2020 0840            ..  Lab Results   Component Value Date     CHOL 198 06/14/2023    CHOL 199 11/17/2022    CHOL 204 (H) 03/24/2022     Lab Results   Component Value Date    HDL 51 06/14/2023    HDL 58 11/17/2022    HDL 46 03/24/2022     Lab Results   Component Value Date    LDLCALC 135.4 06/14/2023    LDLCALC 130.0 11/17/2022    LDLCALC 146 (H) 03/24/2022     Lab Results   Component Value Date    TRIG 58 06/14/2023    TRIG 55 11/17/2022    TRIG 67 03/24/2022     Lab Results   Component Value Date    CHOLHDL 25.8 06/14/2023    CHOLHDL 29.1 11/17/2022    CHOLHDL 22.1 09/13/2021     ..  Lab Results   Component Value Date    WBC 7.5 06/15/2023    HGB 13.5 06/15/2023    HCT 41.1 06/15/2023    MCV 91 03/10/2020     06/15/2023       Test(s) Reviewed  I have reviewed the following in detail:  [x] Stress test   [] Angiography   [x] Echocardiogram   [] Labs   [x] Other:       Assessment:         ICD-10-CM ICD-9-CM   1. Frequent unifocal PVCs  I49.3 427.69   2. Non-rheumatic mitral regurgitation  I34.0 424.0   3. Mild ascending aorta dilatation  I77.810 447.71   4. Coronary-myocardial bridge  Q24.5 746.85   5. Obesity, Class I, BMI 30-34.9  E66.9 278.00   6. Hypercholesterolemia  E78.00 272.0     Problem List Items Addressed This Visit          Cardiac/Vascular    Non-rheumatic mitral regurgitation    Frequent unifocal PVCs - Primary    Relevant Orders    Comprehensive Metabolic Panel    Magnesium    Hypercholesterolemia    Relevant Orders    Comprehensive Metabolic Panel    Coronary-myocardial bridge    Relevant Orders    Comprehensive Metabolic Panel    Mild ascending aorta dilatation     Other Visit Diagnoses       Obesity, Class I, BMI 30-34.9  (Chronic)                Plan:     DAILY MEDS, DO NOT SKIP DISCUSSED IMPORTANCE PATIENT UNDERSTANDS, INCREASE TOPROL TO 25, MG AND TAKE CONSISTENTLY, NO ANGINA NO OVERT HEART FAILURE BENIGN ARRHYTHMIA DIET EXERCISE WEIGHT LOSS RETURN TO CLINIC IN 6 MO WITH LABS      Frequent unifocal PVCs  -     Comprehensive Metabolic Panel;  Future; Expected date: 07/03/2024  -     Magnesium; Future; Expected date: 07/03/2024    Non-rheumatic mitral regurgitation    Mild ascending aorta dilatation  Comments:  LYN    Coronary-myocardial bridge  Comments:  LYN  Orders:  -     Comprehensive Metabolic Panel; Future; Expected date: 07/03/2024    Obesity, Class I, BMI 30-34.9    Hypercholesterolemia  -     Comprehensive Metabolic Panel; Future; Expected date: 07/03/2024    Other orders  -     metoprolol succinate (TOPROL-XL) 25 MG 24 hr tablet; Take 1 tablet (25 mg total) by mouth once daily.  Dispense: 90 tablet; Refill: 1    RTC Low level/low impact aerobic exercise 5x's/wk. Heart healthy diet and risk factor modification.    See labs and med orders.    Aerobic exercise 5x's/wk. Heart healthy diet and risk factor modification.    See labs and med orders.

## 2024-07-03 ENCOUNTER — TELEPHONE (OUTPATIENT)
Dept: CARDIOLOGY | Facility: CLINIC | Age: 44
End: 2024-07-03
Payer: COMMERCIAL

## 2024-11-13 ENCOUNTER — OFFICE VISIT (OUTPATIENT)
Dept: CARDIOLOGY | Facility: CLINIC | Age: 44
End: 2024-11-13
Payer: COMMERCIAL

## 2024-11-13 VITALS
OXYGEN SATURATION: 98 % | SYSTOLIC BLOOD PRESSURE: 115 MMHG | DIASTOLIC BLOOD PRESSURE: 64 MMHG | HEART RATE: 74 BPM | BODY MASS INDEX: 29.73 KG/M2 | WEIGHT: 237.88 LBS

## 2024-11-13 DIAGNOSIS — I49.3 FREQUENT UNIFOCAL PVCS: Chronic | ICD-10-CM

## 2024-11-13 DIAGNOSIS — R07.89 ATYPICAL CHEST PAIN: Primary | ICD-10-CM

## 2024-11-13 DIAGNOSIS — E78.00 HYPERCHOLESTEROLEMIA: Chronic | ICD-10-CM

## 2024-11-13 DIAGNOSIS — Z86.79 H/O ANGINA PECTORIS: ICD-10-CM

## 2024-11-13 DIAGNOSIS — I34.0 NON-RHEUMATIC MITRAL REGURGITATION: Chronic | ICD-10-CM

## 2024-11-13 DIAGNOSIS — I77.810 MILD ASCENDING AORTA DILATATION: Chronic | ICD-10-CM

## 2024-11-13 DIAGNOSIS — Q24.5 CORONARY-MYOCARDIAL BRIDGE: Chronic | ICD-10-CM

## 2024-11-13 PROCEDURE — 1159F MED LIST DOCD IN RCRD: CPT | Mod: CPTII,S$GLB,, | Performed by: INTERNAL MEDICINE

## 2024-11-13 PROCEDURE — 3078F DIAST BP <80 MM HG: CPT | Mod: CPTII,S$GLB,, | Performed by: INTERNAL MEDICINE

## 2024-11-13 PROCEDURE — 99214 OFFICE O/P EST MOD 30 MIN: CPT | Mod: S$GLB,,, | Performed by: INTERNAL MEDICINE

## 2024-11-13 PROCEDURE — 3074F SYST BP LT 130 MM HG: CPT | Mod: CPTII,S$GLB,, | Performed by: INTERNAL MEDICINE

## 2024-11-13 PROCEDURE — 3008F BODY MASS INDEX DOCD: CPT | Mod: CPTII,S$GLB,, | Performed by: INTERNAL MEDICINE

## 2024-11-13 RX ORDER — METOPROLOL SUCCINATE 25 MG/1
25 TABLET, EXTENDED RELEASE ORAL DAILY
Qty: 90 TABLET | Refills: 1 | Status: SHIPPED | OUTPATIENT
Start: 2024-11-13 | End: 2025-11-13

## 2024-11-13 RX ORDER — PRAVASTATIN SODIUM 10 MG/1
10 TABLET ORAL DAILY
Qty: 90 TABLET | Refills: 1 | Status: SHIPPED | OUTPATIENT
Start: 2024-11-13 | End: 2025-11-13

## 2024-11-13 NOTE — PROGRESS NOTES
Subjective:    Patient ID:  Jaren Breen is a 44 y.o. male who presents for Follow-up and Heart Problem        HPI  RECENT NECK CTA OK,FOR CYST/ ENT,  CR OK, DOING OK,SOME FLEETING CP,  FINGER PAIN BETTER WITH STATIN, PATIENT WITH DIFFERENT SYMPTOMATOLOGY, MOSTLY RECURRENT, GETS ANXIOUS ABOUT ANY SYMPTOMS, SEE ROS    Past Medical History:   Diagnosis Date    Anticoagulant long-term use     Arthritis     Heart murmur     Hypercholesterolemia 2018    Stomach ulcer     Valvular regurgitation      Past Surgical History:   Procedure Laterality Date    CARDIAC CATHETERIZATION      CHOLECYSTECTOMY  2017    CORONARY ANGIOGRAPHY N/A 3/10/2020    Procedure: ANGIOGRAM, CORONARY ARTERY;  Surgeon: Gianni Milner MD;  Location: [facility] CATH;  Service: Cardiology;  Laterality: N/A;    ESOPHAGOGASTRODUODENOSCOPY      LEFT HEART CATHETERIZATION Left 3/10/2020    Procedure: Left heart cath;  Surgeon: Gianni Milner MD;  Location: [facility] CATH;  Service: Cardiology;  Laterality: Left;     Family History   Problem Relation Name Age of Onset    Heart disease Mother      Hypertension Father       Social History     Socioeconomic History    Marital status:    Tobacco Use    Smoking status: Former     Current packs/day: 0.00     Types: Cigarettes     Start date:      Quit date:      Years since quittin.8    Smokeless tobacco: Never   Substance and Sexual Activity    Alcohol use: Yes     Comment: Occasional    Drug use: No     Social Drivers of Health     Financial Resource Strain: Unknown (2019)    Received from [facility] (and [facility] [place], [place], and [place] prior to 2021), [facility] (and [facility] [place], [place], and [place] prior to 2021)    Financial Resource Strain     How hard is it for you to pay for the very basics like food, housing, medical care, and heating?: Patient declined   Food Insecurity: Unknown (2019)    Received from [facility] (and [place], [place], and [place] prior to 7/1/2021), [facility] (and [place], [place], and [place] prior to 7/1/2021)    Food Insecurity     Within the past 12 months, you worried that your food would run out before you got the money to buy more.: Patient declined     Within the past 12 months, the food you bought just didn't last and you didn't have money to get more.: Patient declined   Transportation Needs: Unknown (7/12/2019)    Received from [facility] (and [place], [place], and [place] prior to 7/1/2021), [facility] (and [place], [place], and [place] prior to 7/1/2021)    Transportation Needs     In the past 12 months, has lack of transportation kept you from medical appointments or from getting medications?: Patient declined     In the past 12 months, has lack of transportation kept you from meetings, work, or from getting things needed for daily living?: Patient declined       Review of patient's allergies indicates:  No Known Allergies    Current Outpatient Medications:     aspirin 81 MG Chew, 81 mg once daily. , Disp: , Rfl:     dexlansoprazole (DEXILANT) 60 mg capsule, Take 60 mg by mouth., Disp: , Rfl:     sucralfate (CARAFATE) 1 gram tablet, Take 1 g by mouth once daily. , Disp: , Rfl:     metoprolol succinate (TOPROL-XL) 25 MG 24 hr tablet, Take 1 tablet (25 mg total) by mouth once daily., Disp: 90 tablet, Rfl: 1    pravastatin (PRAVACHOL) 10 MG tablet, Take 1 tablet (10 mg total) by mouth once daily., Disp: 90 tablet, Rfl: 1    Review of Systems   Constitutional: Negative for chills, diaphoresis, fever, malaise/fatigue, night sweats, weight gain and weight loss.   HENT:  Negative for congestion and nosebleeds.    Eyes:  Negative for blurred vision and visual disturbance.   Cardiovascular:  Positive for chest pain (WITH HA). Negative for claudication, cyanosis, dyspnea on exertion (MILD), irregular  heartbeat (OCC), leg swelling, near-syncope, orthopnea, palpitations (NOT FELT), paroxysmal nocturnal dyspnea and syncope.   Respiratory:  Negative for cough, hemoptysis, shortness of breath and wheezing.    Endocrine: Negative for polyphagia and polyuria.        SWEATING   Hematologic/Lymphatic: Negative for adenopathy. Does not bruise/bleed easily.   Skin:  Negative for color change and rash.   Musculoskeletal:  Positive for joint pain (SHOULDER,L POSITIONAL). Negative for back pain (CHRONIC) and falls.   Gastrointestinal:  Positive for heartburn (STABLE). Negative for abdominal pain, change in bowel habit, dysphagia, jaundice, melena and nausea.   Genitourinary:  Negative for dysuria and flank pain.   Neurological:  Negative for brief paralysis, dizziness, focal weakness, headaches, light-headedness and weakness.   Psychiatric/Behavioral:  Negative for altered mental status and depression.         Objective:      Vitals:    11/13/24 1520   BP: 115/64   Pulse: 74   SpO2: 98%   Weight: 107.9 kg (237 lb 14 oz)   PainSc: 0-No pain     Body mass index is 29.73 kg/m².    Physical Exam  Constitutional:       Appearance: Normal appearance. He is well-developed and overweight.   HENT:      Head: Normocephalic and atraumatic.   Eyes:      General: No scleral icterus.     Extraocular Movements: Extraocular movements intact.   Neck:      Vascular: Normal carotid pulses. No carotid bruit or JVD.   Cardiovascular:      Rate and Rhythm: Normal rate and regular rhythm. No extrasystoles are present.     Pulses:           Carotid pulses are 2+ on the right side and 2+ on the left side.       Radial pulses are 2+ on the right side and 2+ on the left side.        Femoral pulses are 2+ on the right side and 2+ on the left side.       Posterior tibial pulses are 2+ on the right side and 2+ on the left side.      Heart sounds: Murmur heard.      Systolic murmur is present with a grade of 1/6 at the lower left sternal border.      No  friction rub. No gallop.   Pulmonary:      Effort: Pulmonary effort is normal.      Breath sounds: Normal breath sounds and air entry. No rales.   Chest:      Chest wall: No tenderness.   Abdominal:      Palpations: Abdomen is soft. There is no hepatomegaly.      Tenderness: There is no abdominal tenderness.   Musculoskeletal:         General: Normal range of motion.      Cervical back: Neck supple.      Right lower leg: No edema.      Left lower leg: No edema.      Comments: NO ANKLE EDEMA   Skin:     General: Skin is warm and dry.      Capillary Refill: Capillary refill takes less than 2 seconds.   Neurological:      General: No focal deficit present.      Mental Status: He is alert and oriented to person, place, and time.      Cranial Nerves: No cranial nerve deficit.   Psychiatric:         Mood and Affect: Mood normal.         Speech: Speech normal.         Behavior: Behavior normal.                 ..    Chemistry        Component Value Date/Time     06/14/2023 0920    K 4.5 06/14/2023 0920     06/14/2023 0920    CO2 28 06/14/2023 0920    BUN 15 06/27/2024 0734    BUN 17 06/14/2023 0920    CREATININE 1.00 06/27/2024 0734    CREATININE 1.0 06/14/2023 0920    GLU 95 06/14/2023 0920        Component Value Date/Time    CALCIUM 9.6 06/14/2023 0920    ALKPHOS 50 (L) 06/14/2023 0920    AST 20 06/14/2023 0920    AST 31 05/11/2016 1820    ALT 20 06/14/2023 0920    BILITOT 0.6 06/14/2023 0920    ESTGFRAFRICA 95 06/27/2024 0734    ESTGFRAFRICA >60 03/10/2020 0840    EGFRNONAA >60 03/10/2020 0840            ..  Lab Results   Component Value Date    CHOL 198 06/14/2023    CHOL 199 11/17/2022    CHOL 204 (H) 03/24/2022     Lab Results   Component Value Date    HDL 51 06/14/2023    HDL 58 11/17/2022    HDL 46 03/24/2022     Lab Results   Component Value Date    LDLCALC 135.4 06/14/2023    LDLCALC 130.0 11/17/2022    LDLCALC 146 (H) 03/24/2022     Lab Results   Component Value Date    TRIG 58 06/14/2023    TRIG 55  11/17/2022    TRIG 67 03/24/2022     Lab Results   Component Value Date    CHOLHDL 25.8 06/14/2023    CHOLHDL 29.1 11/17/2022    CHOLHDL 22.1 09/13/2021     ..  Lab Results   Component Value Date    WBC 7.5 06/15/2023    HGB 13.5 06/15/2023    HCT 41.1 06/15/2023    MCV 91 03/10/2020     06/15/2023       Test(s) Reviewed  I have reviewed the following in detail:  [] Stress test   [] Angiography   [] Echocardiogram   [x] Labs   [x] Other:       Assessment:         ICD-10-CM ICD-9-CM   1. Atypical chest pain  R07.89 786.59   2. Non-rheumatic mitral regurgitation  I34.0 424.0   3. Mild ascending aorta dilatation  I77.810 447.71   4. H/O angina pectoris  Z86.79 V12.59   5. Coronary-myocardial bridge  Q24.5 746.85   6. Hypercholesterolemia  E78.00 272.0   7. Frequent unifocal PVCs  I49.3 427.69     Problem List Items Addressed This Visit          Cardiac/Vascular    Non-rheumatic mitral regurgitation    Frequent unifocal PVCs    Relevant Orders    Magnesium    H/O angina pectoris    Hypercholesterolemia    Relevant Orders    Comprehensive Metabolic Panel    Lipid Panel    CK    Precordial pain - Primary    Coronary-myocardial bridge    Mild ascending aorta dilatation        Plan:     STRESS EKG, COMPLIANCE WITH MEDICATION,  ALL CV CLINICALLY STABLE, NO ANGINA, NO HF, NO TIA, NO CLINICAL ARRHYTHMIA,CONTINUE CURRENT MEDS, EDUCATION, DIET, EXERCISE, WEIGHT LOSS RETURN TO CLINIC IN 6 MONTHS WITH LABS      Atypical chest pain  -     Exercise Stress - EKG; Future    Non-rheumatic mitral regurgitation    Mild ascending aorta dilatation    H/O angina pectoris    Coronary-myocardial bridge    Hypercholesterolemia  -     Comprehensive Metabolic Panel; Future; Expected date: 11/13/2024  -     Lipid Panel; Future; Expected date: 11/13/2024  -     CK; Future; Expected date: 11/13/2024    Frequent unifocal PVCs  -     Magnesium; Future; Expected date: 11/13/2024    Other orders  -     pravastatin (PRAVACHOL) 10 MG tablet; Take  1 tablet (10 mg total) by mouth once daily.  Dispense: 90 tablet; Refill: 1  -     metoprolol succinate (TOPROL-XL) 25 MG 24 hr tablet; Take 1 tablet (25 mg total) by mouth once daily.  Dispense: 90 tablet; Refill: 1    RTC Low level/low impact aerobic exercise 5x's/wk. Heart healthy diet and risk factor modification.    See labs and med orders.    Aerobic exercise 5x's/wk. Heart healthy diet and risk factor modification.    See labs and med orders.      ALL CV CLINICALLY STABLE, NO ANGINA, NO HF, NO TIA, NO CLINICAL ARRHYTHMIA,CONTINUE CURRENT MEDS, EDUCATION, DIET, EXERCISE

## 2024-11-19 ENCOUNTER — HOSPITAL ENCOUNTER (OUTPATIENT)
Dept: CARDIOLOGY | Facility: HOSPITAL | Age: 44
Discharge: HOME | End: 2024-11-19
Attending: INTERNAL MEDICINE
Payer: COMMERCIAL

## 2024-11-19 VITALS — HEIGHT: 75 IN | WEIGHT: 237 LBS | BODY MASS INDEX: 29.47 KG/M2

## 2024-11-19 DIAGNOSIS — R07.89 ATYPICAL CHEST PAIN: ICD-10-CM

## 2024-11-19 LAB
CV STRESS BASE HR: 82 BPM
DIASTOLIC BLOOD PRESSURE: 74 MMHG
OHS CV CPX 1 MINUTE RECOVERY HEART RATE: 99 BPM
OHS CV CPX 85 PERCENT MAX PREDICTED HEART RATE MALE: 150
OHS CV CPX ESTIMATED METS: 12
OHS CV CPX MAX PREDICTED HEART RATE: 176
OHS CV CPX PATIENT IS FEMALE: 0
OHS CV CPX PATIENT IS MALE: 1
OHS CV CPX PEAK DIASTOLIC BLOOD PRESSURE: 43 MMHG
OHS CV CPX PEAK HEAR RATE: 160 BPM
OHS CV CPX PEAK RATE PRESSURE PRODUCT: NORMAL
OHS CV CPX PEAK SYSTOLIC BLOOD PRESSURE: 183 MMHG
OHS CV CPX PERCENT MAX PREDICTED HEART RATE ACHIEVED: 91
OHS CV CPX RATE PRESSURE PRODUCT PRESENTING: 8938
STRESS ECHO POST EXERCISE DUR MIN: 6 MINUTES
STRESS ECHO POST EXERCISE DUR SEC: 25 SECONDS
SYSTOLIC BLOOD PRESSURE: 109 MMHG

## 2024-11-19 PROCEDURE — 93018 CV STRESS TEST I&R ONLY: CPT | Mod: ,,, | Performed by: INTERNAL MEDICINE

## 2024-11-19 PROCEDURE — 93017 CV STRESS TEST TRACING ONLY: CPT | Mod: PO

## 2024-11-19 PROCEDURE — 93016 CV STRESS TEST SUPVJ ONLY: CPT | Mod: ,,, | Performed by: INTERNAL MEDICINE

## 2025-02-25 ENCOUNTER — LAB VISIT (OUTPATIENT)
Dept: PRIMARY CARE CLINIC | Facility: CLINIC | Age: 45
End: 2025-02-25
Payer: COMMERCIAL

## 2025-02-25 DIAGNOSIS — Q24.5 CORONARY-MYOCARDIAL BRIDGE: Chronic | ICD-10-CM

## 2025-02-25 DIAGNOSIS — I49.3 FREQUENT UNIFOCAL PVCS: ICD-10-CM

## 2025-02-25 DIAGNOSIS — E78.00 HYPERCHOLESTEROLEMIA: Chronic | ICD-10-CM

## 2025-02-25 LAB
ALBUMIN SERPL BCP-MCNC: 4.1 G/DL (ref 3.5–5.2)
ALBUMIN SERPL BCP-MCNC: 4.1 G/DL (ref 3.5–5.2)
ALP SERPL-CCNC: 53 U/L (ref 40–150)
ALP SERPL-CCNC: 53 U/L (ref 40–150)
ALT SERPL W/O P-5'-P-CCNC: 23 U/L (ref 10–44)
ALT SERPL W/O P-5'-P-CCNC: 23 U/L (ref 10–44)
ANION GAP SERPL CALC-SCNC: 8 MMOL/L (ref 8–16)
ANION GAP SERPL CALC-SCNC: 8 MMOL/L (ref 8–16)
AST SERPL-CCNC: 32 U/L (ref 10–40)
AST SERPL-CCNC: 32 U/L (ref 10–40)
BILIRUB SERPL-MCNC: 0.7 MG/DL (ref 0.1–1)
BILIRUB SERPL-MCNC: 0.7 MG/DL (ref 0.1–1)
BUN SERPL-MCNC: 13 MG/DL (ref 6–20)
BUN SERPL-MCNC: 13 MG/DL (ref 6–20)
CALCIUM SERPL-MCNC: 9.1 MG/DL (ref 8.7–10.5)
CALCIUM SERPL-MCNC: 9.1 MG/DL (ref 8.7–10.5)
CHLORIDE SERPL-SCNC: 107 MMOL/L (ref 95–110)
CHLORIDE SERPL-SCNC: 107 MMOL/L (ref 95–110)
CHOLEST SERPL-MCNC: 183 MG/DL (ref 120–199)
CHOLEST/HDLC SERPL: 3.8 {RATIO} (ref 2–5)
CO2 SERPL-SCNC: 25 MMOL/L (ref 23–29)
CO2 SERPL-SCNC: 25 MMOL/L (ref 23–29)
CREAT SERPL-MCNC: 1 MG/DL (ref 0.5–1.4)
CREAT SERPL-MCNC: 1 MG/DL (ref 0.5–1.4)
EST. GFR  (NO RACE VARIABLE): >60 ML/MIN/1.73 M^2
EST. GFR  (NO RACE VARIABLE): >60 ML/MIN/1.73 M^2
GLUCOSE SERPL-MCNC: 117 MG/DL (ref 70–110)
GLUCOSE SERPL-MCNC: 117 MG/DL (ref 70–110)
HDLC SERPL-MCNC: 48 MG/DL (ref 40–75)
HDLC SERPL: 26.2 % (ref 20–50)
LDLC SERPL CALC-MCNC: 123.6 MG/DL (ref 63–159)
MAGNESIUM SERPL-MCNC: 2.1 MG/DL (ref 1.6–2.6)
NONHDLC SERPL-MCNC: 135 MG/DL
POTASSIUM SERPL-SCNC: 4.4 MMOL/L (ref 3.5–5.1)
POTASSIUM SERPL-SCNC: 4.4 MMOL/L (ref 3.5–5.1)
PROT SERPL-MCNC: 7.3 G/DL (ref 6–8.4)
PROT SERPL-MCNC: 7.3 G/DL (ref 6–8.4)
SODIUM SERPL-SCNC: 140 MMOL/L (ref 136–145)
SODIUM SERPL-SCNC: 140 MMOL/L (ref 136–145)
TRIGL SERPL-MCNC: 57 MG/DL (ref 30–150)

## 2025-02-25 PROCEDURE — 80061 LIPID PANEL: CPT | Performed by: INTERNAL MEDICINE

## 2025-02-25 PROCEDURE — 83735 ASSAY OF MAGNESIUM: CPT | Performed by: INTERNAL MEDICINE

## 2025-02-25 PROCEDURE — 80053 COMPREHEN METABOLIC PANEL: CPT | Performed by: INTERNAL MEDICINE

## 2025-02-25 NOTE — PROGRESS NOTES
Venipuncture performed with 21 gauge butterfly, x's 1 attempt.  Successful venipuncture to R Basilic vein.  Specimens collected per orders.       Pressure dressing applied to site, instructed patient to remove dressing in 10-15 minutes, OK to re-adjust dressing if pressure causing any discomfort, to observe closely for numbness and/or discoloration to hand or fingers, and to notify provider if bleeding persists after applying constant pressure lasting 30 minutes.

## 2025-05-07 ENCOUNTER — OFFICE VISIT (OUTPATIENT)
Dept: CARDIOLOGY | Facility: CLINIC | Age: 45
End: 2025-05-07
Payer: COMMERCIAL

## 2025-05-07 VITALS
HEIGHT: 75 IN | HEART RATE: 71 BPM | DIASTOLIC BLOOD PRESSURE: 60 MMHG | BODY MASS INDEX: 28.73 KG/M2 | SYSTOLIC BLOOD PRESSURE: 103 MMHG | WEIGHT: 231.06 LBS

## 2025-05-07 DIAGNOSIS — E66.3 OVERWEIGHT (BMI 25.0-29.9): Chronic | ICD-10-CM

## 2025-05-07 DIAGNOSIS — E78.00 HYPERCHOLESTEROLEMIA: Chronic | ICD-10-CM

## 2025-05-07 DIAGNOSIS — I34.0 NON-RHEUMATIC MITRAL REGURGITATION: Chronic | ICD-10-CM

## 2025-05-07 DIAGNOSIS — I47.29 NSVT (NONSUSTAINED VENTRICULAR TACHYCARDIA): ICD-10-CM

## 2025-05-07 DIAGNOSIS — I20.89 ANGINA OF EFFORT: Primary | Chronic | ICD-10-CM

## 2025-05-07 PROCEDURE — 3078F DIAST BP <80 MM HG: CPT | Mod: CPTII,S$GLB,, | Performed by: INTERNAL MEDICINE

## 2025-05-07 PROCEDURE — 3074F SYST BP LT 130 MM HG: CPT | Mod: CPTII,S$GLB,, | Performed by: INTERNAL MEDICINE

## 2025-05-07 PROCEDURE — 1159F MED LIST DOCD IN RCRD: CPT | Mod: CPTII,S$GLB,, | Performed by: INTERNAL MEDICINE

## 2025-05-07 PROCEDURE — 3008F BODY MASS INDEX DOCD: CPT | Mod: CPTII,S$GLB,, | Performed by: INTERNAL MEDICINE

## 2025-05-07 PROCEDURE — 99214 OFFICE O/P EST MOD 30 MIN: CPT | Mod: S$GLB,,, | Performed by: INTERNAL MEDICINE

## 2025-05-07 RX ORDER — METOPROLOL SUCCINATE 25 MG/1
25 TABLET, EXTENDED RELEASE ORAL DAILY
Qty: 90 TABLET | Refills: 1 | Status: SHIPPED | OUTPATIENT
Start: 2025-05-07 | End: 2026-05-07

## 2025-05-07 RX ORDER — PRAVASTATIN SODIUM 20 MG/1
10 TABLET ORAL NIGHTLY
Qty: 90 TABLET | Refills: 1 | Status: SHIPPED | OUTPATIENT
Start: 2025-05-07 | End: 2026-05-07

## 2025-05-07 NOTE — PROGRESS NOTES
Subjective:    Patient ID:  Jaren Breen is a 45 y.o. male who presents for Follow-up and Atypical chest pain        HPI  MISSED MULTIPLE APPOINTMENTS, STRESS EKG NEGATIVE FOR ISCHEMIA FREQUENT PVCS AND 1 SHORT RUN OF NSVT 5 BEATS, LAST LABS CMP OK MAGNESIUM 2.3, , HDL 48, TRIGLYCERIDE 57,ER VISIT IN JANUARY WITH SEVERE CP,DOING BETTER, OCC PALP, TAKING 1/2 TOPROL,  TO HAVE EGD AND COLONOSCOPY, SOMEWHAT VAGUE AND CHRONIC SYMPTOMS,, SEE ROS    Past Medical History:   Diagnosis Date    Anticoagulant long-term use     Arthritis     Heart murmur     Hypercholesterolemia 2018    Stomach ulcer     Valvular regurgitation      Past Surgical History:   Procedure Laterality Date    CARDIAC CATHETERIZATION      CHOLECYSTECTOMY  2017    CORONARY ANGIOGRAPHY N/A 3/10/2020    Procedure: ANGIOGRAM, CORONARY ARTERY;  Surgeon: Gianni Milner MD;  Location: [facility] CATH;  Service: Cardiology;  Laterality: N/A;    ESOPHAGOGASTRODUODENOSCOPY      LEFT HEART CATHETERIZATION Left 3/10/2020    Procedure: Left heart cath;  Surgeon: Gianni Milner MD;  [facility] CATH;  Service: Cardiology;  Laterality: Left;     Family History   Problem Relation Name Age of Onset    Heart disease Mother      Hypertension Father       Social History     Socioeconomic History    Marital status:    Tobacco Use    Smoking status: Former     Current packs/day: 0.00     Types: Cigarettes     Start date:      Quit date:      Years since quittin.3    Smokeless tobacco: Never   Substance and Sexual Activity    Alcohol use: Yes     Comment: Occasional    Drug use: No     Social Drivers of Health     Financial Resource Strain: Unknown (2019)    Received from [employer] (and [facility], [place], and [place] prior to 2021)    Financial Resource Strain     How hard is it for you to pay for the very basics like food, housing, medical care, and heating?: Patient declined   Food Insecurity: Unknown  (7/12/2019)    Received from "" (and [employer] [place], [place], and [place] prior to 7/1/2021)    Food Insecurity     Within the past 12 months, you worried that your food would run out before you got the money to buy more.: Patient declined     Within the past 12 months, the food you bought just didn't last and you didn't have money to get more.: Patient declined   Transportation Needs: Unknown (7/12/2019)    Received from "" (and [facility] [place], [place], and [place] prior to 7/1/2021)    Transportation Needs     In the past 12 months, has lack of transportation kept you from medical appointments or from getting medications?: Patient declined     In the past 12 months, has lack of transportation kept you from meetings, work, or from getting things needed for daily living?: Patient declined       Review of patient's allergies indicates:  No Known Allergies  Current Medications[1]    Review of Systems   Constitutional: Negative for chills, diaphoresis, fever, malaise/fatigue, night sweats, weight gain and weight loss.   HENT:  Negative for congestion and nosebleeds.    Eyes:  Negative for blurred vision and visual disturbance.   Cardiovascular:  Positive for chest pain (LESS). Negative for claudication, cyanosis, dyspnea on exertion (MILD), irregular heartbeat (OCC), leg swelling, near-syncope, orthopnea, palpitations (OCC), paroxysmal nocturnal dyspnea and syncope.   Respiratory:  Negative for cough, hemoptysis, shortness of breath (OCC) and wheezing (OCC).    Endocrine: Negative for polyphagia and polyuria.        SWEATING   Hematologic/Lymphatic: Negative for adenopathy. Does not bruise/bleed easily.   Skin:  Negative for color change and rash.   Musculoskeletal:  Positive for joint pain (SHOULDER,L POSITIONAL). Negative for back pain (CHRONIC) and falls.   Gastrointestinal:  Positive for heartburn (TO HAVE EGD). Negative for abdominal pain, change in bowel  "habit, dysphagia, jaundice, melena and nausea.   Genitourinary:  Negative for dysuria and flank pain.   Neurological:  Negative for brief paralysis, dizziness, focal weakness, headaches, light-headedness and weakness.   Psychiatric/Behavioral:  Negative for altered mental status and depression.         Objective:      Vitals:    05/07/25 0924   BP: 103/60   Pulse: 71   Weight: 104.8 kg (231 lb 0.7 oz)   Height: 6' 3" (1.905 m)   PainSc: 0-No pain     Body mass index is 28.88 kg/m².    Physical Exam  Constitutional:       Appearance: Normal appearance. He is well-developed and overweight.   HENT:      Head: Normocephalic and atraumatic.   Eyes:      General: No scleral icterus.     Extraocular Movements: Extraocular movements intact.   Neck:      Vascular: Normal carotid pulses. No carotid bruit or JVD.   Cardiovascular:      Rate and Rhythm: Normal rate and regular rhythm. No extrasystoles are present.     Pulses:           Carotid pulses are 2+ on the right side and 2+ on the left side.       Radial pulses are 2+ on the right side and 2+ on the left side.        Femoral pulses are 2+ on the right side and 2+ on the left side.       Posterior tibial pulses are 2+ on the right side and 2+ on the left side.      Heart sounds: Murmur heard.      Systolic murmur is present with a grade of 1/6 at the lower left sternal border and apex.      No friction rub. No gallop.   Pulmonary:      Effort: Pulmonary effort is normal.      Breath sounds: Normal breath sounds and air entry. No rales.   Chest:      Chest wall: No tenderness.   Abdominal:      Palpations: Abdomen is soft. There is no hepatomegaly.      Tenderness: There is no abdominal tenderness.   Musculoskeletal:         General: Normal range of motion.      Cervical back: Neck supple.      Right lower leg: No edema.      Left lower leg: No edema.      Comments: NO ANKLE EDEMA   Skin:     General: Skin is warm and dry.      Capillary Refill: Capillary refill takes " less than 2 seconds.   Neurological:      General: No focal deficit present.      Mental Status: He is alert and oriented to person, place, and time.      Cranial Nerves: No cranial nerve deficit.   Psychiatric:         Mood and Affect: Mood normal.         Speech: Speech normal.         Behavior: Behavior normal.                 ..    Chemistry        Component Value Date/Time     02/25/2025 0838     02/25/2025 0838    K 4.4 02/25/2025 0838    K 4.4 02/25/2025 0838     02/25/2025 0838     02/25/2025 0838    CO2 25 02/25/2025 0838    CO2 25 02/25/2025 0838    BUN 13 02/25/2025 0838    BUN 13 02/25/2025 0838    CREATININE 1.0 02/25/2025 0838    CREATININE 1.0 02/25/2025 0838     (H) 02/25/2025 0838     (H) 02/25/2025 0838        Component Value Date/Time    CALCIUM 9.1 02/25/2025 0838    CALCIUM 9.1 02/25/2025 0838    ALKPHOS 53 02/25/2025 0838    ALKPHOS 53 02/25/2025 0838    AST 32 02/25/2025 0838    AST 32 02/25/2025 0838    AST 31 05/11/2016 1820    ALT 23 02/25/2025 0838    ALT 23 02/25/2025 0838    BILITOT 0.7 02/25/2025 0838    BILITOT 0.7 02/25/2025 0838    ESTGFRAFRICA 95 06/27/2024 0734    ESTGFRAFRICA >60 03/10/2020 0840    EGFRNONAA >60 03/10/2020 0840            ..  Lab Results   Component Value Date    CHOL 183 02/25/2025    CHOL 198 06/14/2023    CHOL 199 11/17/2022     Lab Results   Component Value Date    HDL 48 02/25/2025    HDL 51 06/14/2023    HDL 58 11/17/2022     Lab Results   Component Value Date    LDLCALC 123.6 02/25/2025    LDLCALC 135.4 06/14/2023    LDLCALC 130.0 11/17/2022     Lab Results   Component Value Date    TRIG 57 02/25/2025    TRIG 58 06/14/2023    TRIG 55 11/17/2022     Lab Results   Component Value Date    CHOLHDL 26.2 02/25/2025    CHOLHDL 25.8 06/14/2023    CHOLHDL 29.1 11/17/2022     ..  Lab Results   Component Value Date    WBC 7.5 06/15/2023    HGB 13.5 06/15/2023    HCT 41.1 06/15/2023    MCV 91 03/10/2020     06/15/2023        Test(s) Reviewed  I have reviewed the following in detail:  [] Stress test   [] Angiography   [] Echocardiogram   [x] Labs   [] Other:       Assessment:         ICD-10-CM ICD-9-CM   1. Angina of effort  I20.89 413.9   2. NSVT (nonsustained ventricular tachycardia)  I47.29 427.1   3. Hypercholesterolemia  E78.00 272.0   4. Non-rheumatic mitral regurgitation  I34.0 424.0   5. Overweight (BMI 25.0-29.9)  E66.3 278.02     Problem List Items Addressed This Visit          Cardiac/Vascular    Non-rheumatic mitral regurgitation    Hypercholesterolemia    Relevant Orders    Lipid Panel    Hepatic Function Panel    CK    NSVT (nonsustained ventricular tachycardia)    Relevant Orders    Magnesium    Holter monitor - 72 hour    Angina of effort - Primary    Relevant Orders    Holter monitor - 72 hour       Endocrine    Overweight (BMI 25.0-29.9)        Plan:     INCREASE TOPROL TO 25 MG, INCREASE PRAVACHOL, TO 20 MG, TARGET LDL LOWER PLUS CO-Q-10, 200 MG, CHECK ARRHYTHMIA 72 HOLTER,  RETURN TO CLINIC IN 3 MO WITH LABS, LOW THRESHOLD FOR CARDIAC CATHETERIZATION IF SYMPTOMS RECUR OR INCREASED IN VIEW OF MULTIPLE RISK FACTOR CARDIAC CATHETERIZATION IN 2020 SHOWED MILD DISEASE    ALL CV CLINICALLY STABLE, CLASS 1-2 ANGINA, NO HF, NO TIA, ASSESS CLINICAL ARRHYTHMIA,CONTINUE CURRENT MEDS, EDUCATION, DIET, EXERCISE , RETURN TO CLINIC IN 3 MONTHS        Angina of effort  -     Holter monitor - 72 hour; Future    NSVT (nonsustained ventricular tachycardia)  -     Magnesium; Future; Expected date: 05/07/2025  -     Holter monitor - 72 hour; Future    Hypercholesterolemia  -     Lipid Panel; Future; Expected date: 08/07/2025  -     Hepatic Function Panel; Future; Expected date: 05/07/2025  -     CK; Future; Expected date: 05/07/2025    Non-rheumatic mitral regurgitation    Overweight (BMI 25.0-29.9)    Other orders  -     metoprolol succinate (TOPROL-XL) 25 MG 24 hr tablet; Take 1 tablet (25 mg total) by mouth once daily.  Dispense:  90 tablet; Refill: 1  -     pravastatin (PRAVACHOL) 20 MG tablet; Take 0.5 tablets (10 mg total) by mouth every evening.  Dispense: 90 tablet; Refill: 1    RTC Low level/low impact aerobic exercise 5x's/wk. Heart healthy diet and risk factor modification.    See labs and med orders.    Aerobic exercise 5x's/wk. Heart healthy diet and risk factor modification.    See labs and med orders.             [1]   Current Outpatient Medications:     aspirin 81 MG Chew, 81 mg once daily. , Disp: , Rfl:     dexlansoprazole (DEXILANT) 60 mg capsule, Take 60 mg by mouth., Disp: , Rfl:     sucralfate (CARAFATE) 1 gram tablet, Take 1 g by mouth once daily. , Disp: , Rfl:     metoprolol succinate (TOPROL-XL) 25 MG 24 hr tablet, Take 1 tablet (25 mg total) by mouth once daily., Disp: 90 tablet, Rfl: 1    pravastatin (PRAVACHOL) 20 MG tablet, Take 0.5 tablets (10 mg total) by mouth every evening., Disp: 90 tablet, Rfl: 1

## 2025-05-14 ENCOUNTER — HOSPITAL ENCOUNTER (OUTPATIENT)
Dept: CARDIOLOGY | Facility: HOSPITAL | Age: 45
Discharge: HOME | End: 2025-05-14
Attending: INTERNAL MEDICINE
Payer: COMMERCIAL

## 2025-05-14 DIAGNOSIS — I20.89 ANGINA OF EFFORT: Chronic | ICD-10-CM

## 2025-05-14 DIAGNOSIS — I47.29 NSVT (NONSUSTAINED VENTRICULAR TACHYCARDIA): ICD-10-CM

## 2025-05-14 PROCEDURE — 93242 EXT ECG>48HR<7D RECORDING: CPT | Mod: PO

## 2025-08-14 ENCOUNTER — LAB VISIT (OUTPATIENT)
Dept: PRIMARY CARE CLINIC | Facility: CLINIC | Age: 45
End: 2025-08-14
Attending: INTERNAL MEDICINE
Payer: COMMERCIAL

## 2025-08-14 DIAGNOSIS — E78.00 HYPERCHOLESTEROLEMIA: Chronic | ICD-10-CM

## 2025-08-14 DIAGNOSIS — I47.29 NSVT (NONSUSTAINED VENTRICULAR TACHYCARDIA): ICD-10-CM

## 2025-08-14 LAB
ALBUMIN SERPL BCP-MCNC: 4.1 G/DL (ref 3.5–5.2)
ALP SERPL-CCNC: 49 UNIT/L (ref 40–150)
ALT SERPL W/O P-5'-P-CCNC: 28 UNIT/L (ref 0–55)
AST SERPL-CCNC: 30 UNIT/L (ref 0–50)
BILIRUB DIRECT SERPL-MCNC: 0.2 MG/DL (ref 0.1–0.3)
BILIRUB SERPL-MCNC: 0.7 MG/DL (ref 0.1–1)
CHOLEST SERPL-MCNC: 202 MG/DL (ref 120–199)
CHOLEST/HDLC SERPL: 4.8 {RATIO} (ref 2–5)
CK SERPL-CCNC: 104 U/L (ref 20–200)
HDLC SERPL-MCNC: 42 MG/DL (ref 40–75)
HDLC SERPL: 20.8 % (ref 20–50)
LDLC SERPL CALC-MCNC: 143 MG/DL (ref 63–159)
MAGNESIUM SERPL-MCNC: 2.1 MG/DL (ref 1.6–2.6)
NONHDLC SERPL-MCNC: 160 MG/DL
PROT SERPL-MCNC: 6.9 GM/DL (ref 6–8.4)
TRIGL SERPL-MCNC: 85 MG/DL (ref 30–150)

## 2025-08-14 PROCEDURE — 80061 LIPID PANEL: CPT | Performed by: INTERNAL MEDICINE

## 2025-08-14 PROCEDURE — 36415 COLL VENOUS BLD VENIPUNCTURE: CPT | Mod: S$GLB,,, | Performed by: INTERNAL MEDICINE

## 2025-08-14 PROCEDURE — 83735 ASSAY OF MAGNESIUM: CPT | Performed by: INTERNAL MEDICINE

## 2025-08-14 PROCEDURE — 84075 ASSAY ALKALINE PHOSPHATASE: CPT | Performed by: INTERNAL MEDICINE

## 2025-08-14 PROCEDURE — 82550 ASSAY OF CK (CPK): CPT | Performed by: INTERNAL MEDICINE

## 2025-08-20 ENCOUNTER — OFFICE VISIT (OUTPATIENT)
Dept: CARDIOLOGY | Facility: CLINIC | Age: 45
End: 2025-08-20
Attending: INTERNAL MEDICINE
Payer: COMMERCIAL

## 2025-08-20 VITALS
HEART RATE: 67 BPM | SYSTOLIC BLOOD PRESSURE: 105 MMHG | WEIGHT: 231.94 LBS | BODY MASS INDEX: 28.84 KG/M2 | HEIGHT: 75 IN | DIASTOLIC BLOOD PRESSURE: 55 MMHG

## 2025-08-20 DIAGNOSIS — E78.00 HYPERCHOLESTEROLEMIA: Chronic | ICD-10-CM

## 2025-08-20 DIAGNOSIS — I49.3 FREQUENT UNIFOCAL PVCS: Chronic | ICD-10-CM

## 2025-08-20 DIAGNOSIS — Q24.5 CORONARY-MYOCARDIAL BRIDGE: Chronic | ICD-10-CM

## 2025-08-20 DIAGNOSIS — Z82.49 FAMILY HISTORY OF PREMATURE CAD: Chronic | ICD-10-CM

## 2025-08-20 DIAGNOSIS — I34.0 NON-RHEUMATIC MITRAL REGURGITATION: Primary | Chronic | ICD-10-CM

## 2025-08-20 DIAGNOSIS — E66.3 OVERWEIGHT (BMI 25.0-29.9): Chronic | ICD-10-CM

## 2025-08-20 PROCEDURE — 3074F SYST BP LT 130 MM HG: CPT | Mod: CPTII,S$GLB,, | Performed by: INTERNAL MEDICINE

## 2025-08-20 PROCEDURE — 3078F DIAST BP <80 MM HG: CPT | Mod: CPTII,S$GLB,, | Performed by: INTERNAL MEDICINE

## 2025-08-20 PROCEDURE — 3008F BODY MASS INDEX DOCD: CPT | Mod: CPTII,S$GLB,, | Performed by: INTERNAL MEDICINE

## 2025-08-20 PROCEDURE — 99214 OFFICE O/P EST MOD 30 MIN: CPT | Mod: S$GLB,,, | Performed by: INTERNAL MEDICINE

## 2025-08-20 PROCEDURE — 1159F MED LIST DOCD IN RCRD: CPT | Mod: CPTII,S$GLB,, | Performed by: INTERNAL MEDICINE
